# Patient Record
Sex: FEMALE | Race: BLACK OR AFRICAN AMERICAN | Employment: PART TIME | URBAN - METROPOLITAN AREA
[De-identification: names, ages, dates, MRNs, and addresses within clinical notes are randomized per-mention and may not be internally consistent; named-entity substitution may affect disease eponyms.]

---

## 2014-10-13 LAB — PAP SMEAR, EXTERNAL: NORMAL

## 2015-08-27 LAB — HIV AG/AB: NORMAL

## 2021-09-08 ENCOUNTER — OFFICE VISIT (OUTPATIENT)
Dept: FAMILY MEDICINE CLINIC | Age: 31
End: 2021-09-08

## 2021-09-08 VITALS
SYSTOLIC BLOOD PRESSURE: 120 MMHG | DIASTOLIC BLOOD PRESSURE: 80 MMHG | BODY MASS INDEX: 37.49 KG/M2 | HEART RATE: 100 BPM | HEIGHT: 65 IN | WEIGHT: 225 LBS | OXYGEN SATURATION: 99 % | TEMPERATURE: 97.3 F

## 2021-09-08 DIAGNOSIS — J02.0 ACUTE STREPTOCOCCAL PHARYNGITIS: ICD-10-CM

## 2021-09-08 DIAGNOSIS — J02.9 SORE THROAT: Primary | ICD-10-CM

## 2021-09-08 LAB
Lab: NORMAL
PERFORMING INSTRUMENT: NORMAL
QC PASS/FAIL: NORMAL
S PYO AG THROAT QL: POSITIVE
SARS-COV-2, POC: NORMAL

## 2021-09-08 PROCEDURE — 87426 SARSCOV CORONAVIRUS AG IA: CPT | Performed by: NURSE PRACTITIONER

## 2021-09-08 PROCEDURE — 87880 STREP A ASSAY W/OPTIC: CPT | Performed by: NURSE PRACTITIONER

## 2021-09-08 PROCEDURE — 99213 OFFICE O/P EST LOW 20 MIN: CPT | Performed by: NURSE PRACTITIONER

## 2021-09-08 RX ORDER — IBUPROFEN 400 MG/1
600 TABLET ORAL EVERY 6 HOURS PRN
COMMUNITY
End: 2022-04-14 | Stop reason: ALTCHOICE

## 2021-09-08 RX ORDER — AMOXICILLIN 875 MG/1
875 TABLET, COATED ORAL 2 TIMES DAILY
Qty: 20 TABLET | Refills: 0 | Status: SHIPPED | OUTPATIENT
Start: 2021-09-08 | End: 2021-09-18

## 2021-09-08 SDOH — ECONOMIC STABILITY: TRANSPORTATION INSECURITY
IN THE PAST 12 MONTHS, HAS LACK OF TRANSPORTATION KEPT YOU FROM MEETINGS, WORK, OR FROM GETTING THINGS NEEDED FOR DAILY LIVING?: NO

## 2021-09-08 SDOH — ECONOMIC STABILITY: TRANSPORTATION INSECURITY
IN THE PAST 12 MONTHS, HAS THE LACK OF TRANSPORTATION KEPT YOU FROM MEDICAL APPOINTMENTS OR FROM GETTING MEDICATIONS?: NO

## 2021-09-08 SDOH — ECONOMIC STABILITY: FOOD INSECURITY: WITHIN THE PAST 12 MONTHS, THE FOOD YOU BOUGHT JUST DIDN'T LAST AND YOU DIDN'T HAVE MONEY TO GET MORE.: SOMETIMES TRUE

## 2021-09-08 SDOH — ECONOMIC STABILITY: FOOD INSECURITY: WITHIN THE PAST 12 MONTHS, YOU WORRIED THAT YOUR FOOD WOULD RUN OUT BEFORE YOU GOT MONEY TO BUY MORE.: SOMETIMES TRUE

## 2021-09-08 ASSESSMENT — ENCOUNTER SYMPTOMS
WHEEZING: 0
EYE ITCHING: 0
SINUS PRESSURE: 0
VOMITING: 0
CHEST TIGHTNESS: 0
SINUS PAIN: 0
NAUSEA: 0
COUGH: 0
SORE THROAT: 1
ABDOMINAL PAIN: 0
EYE DISCHARGE: 0
DIARRHEA: 0
RHINORRHEA: 0
TROUBLE SWALLOWING: 1
COLOR CHANGE: 0
SHORTNESS OF BREATH: 0
EYE REDNESS: 0
VOICE CHANGE: 0

## 2021-09-08 ASSESSMENT — SOCIAL DETERMINANTS OF HEALTH (SDOH): HOW HARD IS IT FOR YOU TO PAY FOR THE VERY BASICS LIKE FOOD, HOUSING, MEDICAL CARE, AND HEATING?: SOMEWHAT HARD

## 2021-09-08 ASSESSMENT — PATIENT HEALTH QUESTIONNAIRE - PHQ9
SUM OF ALL RESPONSES TO PHQ QUESTIONS 1-9: 0
SUM OF ALL RESPONSES TO PHQ9 QUESTIONS 1 & 2: 0
SUM OF ALL RESPONSES TO PHQ QUESTIONS 1-9: 0
1. LITTLE INTEREST OR PLEASURE IN DOING THINGS: 0
SUM OF ALL RESPONSES TO PHQ QUESTIONS 1-9: 0
2. FEELING DOWN, DEPRESSED OR HOPELESS: 0

## 2021-09-08 NOTE — PATIENT INSTRUCTIONS
Patient Education        Strep Throat: Care Instructions  Your Care Instructions     Strep throat is a bacterial infection that causes sudden, severe sore throat and fever. Strep throat, which is caused by bacteria called streptococcus, is treated with antibiotics. Sometimes a strep test is necessary to tell if the sore throat is caused by strep bacteria. Treatment can help ease symptoms and may prevent future problems. Follow-up care is a key part of your treatment and safety. Be sure to make and go to all appointments, and call your doctor if you are having problems. It's also a good idea to know your test results and keep a list of the medicines you take. How can you care for yourself at home? · Take your antibiotics as directed. Do not stop taking them just because you feel better. You need to take the full course of antibiotics. · Strep throat can spread to others until 24 hours after you begin taking antibiotics. During this time, avoid contact with other people at work, school, or home, especially infants and children. Do not sneeze or cough on others, and wash your hands often. Keep your drinking glass and eating utensils separate from those of others. Wash these items well in hot, soapy water. · Gargle with warm salt water at least once each hour to help reduce swelling and make your throat feel better. Use 1 teaspoon of salt mixed in 8 fluid ounces of warm water. · Take an over-the-counter pain medication, such as acetaminophen (Tylenol), ibuprofen (Advil, Motrin), or naproxen (Aleve). Read and follow all instructions on the label. · Try an over-the-counter anesthetic throat spray or throat lozenges, which may help relieve throat pain. · Drink plenty of fluids. Fluids may help soothe an irritated throat. Hot fluids, such as tea or soup, may help your throat feel better. · Eat soft solids and drink plenty of clear liquids.  Flavored ice pops, ice cream, scrambled eggs, sherbet, and gelatin dessert (such as Jell-O) may also soothe the throat. · Get lots of rest.  · Do not smoke, and avoid secondhand smoke. If you need help quitting, talk to your doctor about stop-smoking programs and medicines. These can increase your chances of quitting for good. · Use a vaporizer or humidifier to add moisture to the air in your bedroom. Follow the directions for cleaning the machine. When should you call for help? Call your doctor now or seek immediate medical care if:    · You have a new or higher fever.     · You have a fever with a stiff neck or severe headache.     · You have new or worse trouble swallowing.     · Your sore throat gets much worse on one side.     · Your pain becomes much worse on one side of your throat. Watch closely for changes in your health, and be sure to contact your doctor if:    · You are not getting better after 2 days (48 hours).     · You do not get better as expected. Where can you learn more? Go to https://Socius.Whereoscope. org and sign in to your Adcast account. Enter K625 in the FriendFit box to learn more about \"Strep Throat: Care Instructions. \"     If you do not have an account, please click on the \"Sign Up Now\" link. Current as of: December 2, 2020               Content Version: 12.9  © 2006-2021 [employer]. Care instructions adapted under license by [facility] ([facility]). If you have questions about a medical condition or this instruction, always ask your healthcare professional. [place] [address] any warranty or liability for your use of this information. Patient Education        Rapid Strep Test: About This Test  What is it? A rapid strep test checks the bacteria in your throat to see if strep is the cause of your sore throat. Why is this test done? It may be done so your doctor can find out right away whether you have strep throat.  There is another test for strep, called a throat culture, but that test takes a few days to get the results. How do you prepare for the test?  You don't need to do anything before you have this test.  How is the test done? · You will be asked to tilt your head back and open your mouth as wide as possible. · Your doctor will press your tongue down with a flat stick (tongue depressor) and then examine your mouth and throat. · A clean cotton swab will be rubbed over the back of your throat, around your tonsils, and over any red areas or sores to collect a sample. How long does the test take? · The test takes less than a minute. · Results are available in 10 to 15 minutes. Follow-up care is a key part of your treatment and safety. Be sure to make and go to all appointments, and call your doctor if you are having problems. It's also a good idea to keep a list of the medicines you take. Ask your doctor when you can expect to have your test results. Where can you learn more? Go to https://StockLayouts.Flyfit. org and sign in to your [employer] account. Enter B356 in the GT Solar box to learn more about \"Rapid Strep Test: About This Test.\"     If you do not have an account, please click on the \"Sign Up Now\" link. Current as of: December 2, 2020               Content Version: 12.9  © 4904-3479 Health[employer], [employer]. Care instructions adapted under license by [facility] ([facility]). If you have questions about a medical condition or this instruction, always ask your healthcare professional. [place] [address] any warranty or liability for your use of this information.

## 2021-09-08 NOTE — PROGRESS NOTES
Subjective:      Patient ID: Annette Conrad is a 28 y.o. female who presents today for:  Chief Complaint   Patient presents with    Pharyngitis     pt states that sore throat started sunday and has gotten progressively worst        HPI  Patient is here with c/o sore throat for the last 4 days. Reports pain with swallowing. She is not sure about fever but having chills. Says she has not other Uri symptoms. She has been taking tylenol and motrin with OTC salt water. Says she has has strep in the past but does not get it often. History reviewed. No pertinent past medical history. History reviewed. No pertinent surgical history. Social History     Socioeconomic History    Marital status:      Spouse name: Not on file    Number of children: Not on file    Years of education: Not on file    Highest education level: Not on file   Occupational History    Not on file   Tobacco Use    Smoking status: Current Every Day Smoker     Packs/day: 1.00     Years: 10.00     Pack years: 10.00     Types: Cigars    Smokeless tobacco: Never Used   Substance and Sexual Activity    Alcohol use: Yes     Alcohol/week: 2.0 standard drinks     Types: 2 Standard drinks or equivalent per week    Drug use: Never    Sexual activity: Not on file   Other Topics Concern    Not on file   Social History Narrative    Not on file     Social Determinants of Health     Financial Resource Strain: Medium Risk    Difficulty of Paying Living Expenses: Somewhat hard   Food Insecurity: Food Insecurity Present    Worried About Running Out of Food in the Last Year: Sometimes true    Aidan of Food in the Last Year: Sometimes true   Transportation Needs: No Transportation Needs    Lack of Transportation (Medical): No    Lack of Transportation (Non-Medical):  No   Physical Activity:     Days of Exercise per Week:     Minutes of Exercise per Session:    Stress:     Feeling of Stress :    Social Connections:     Frequency of Communication with Friends and Family:     Frequency of Social Gatherings with Friends and Family:     Attends [employer] Services:     Active Member of Clubs or Organizations:     Attends Club or Organization Meetings:     Marital Status:    Intimate Partner Violence:     Fear of Current or Ex-Partner:     Emotionally Abused:     Physically Abused:     Sexually Abused:      History reviewed. No pertinent family history. No Known Allergies  Current Outpatient Medications   Medication Sig Dispense Refill    ibuprofen (ADVIL;MOTRIN) 400 MG tablet Take 600 mg by mouth every 6 hours as needed for Pain      amoxicillin (AMOXIL) 875 MG tablet Take 1 tablet by mouth 2 times daily for 10 days 20 tablet 0     No current facility-administered medications for this visit. Review of Systems   Constitutional: Positive for appetite change, chills and fatigue. Negative for activity change, diaphoresis, fever and unexpected weight change. HENT: Positive for sore throat and trouble swallowing. Negative for congestion, ear discharge, ear pain, postnasal drip, rhinorrhea, sinus pressure, sinus pain, sneezing, tinnitus and voice change. Eyes: Negative for discharge, redness and itching. Respiratory: Negative for cough, chest tightness, shortness of breath and wheezing. Cardiovascular: Negative for chest pain. Gastrointestinal: Negative for abdominal pain, diarrhea, nausea and vomiting. Musculoskeletal: Positive for myalgias. Negative for arthralgias. Skin: Negative for color change and rash. Neurological: Negative for dizziness, light-headedness and headaches. Hematological: Negative for adenopathy. Objective:   /80 (Site: Left Upper Arm, Position: Sitting, Cuff Size: Medium Adult)   Pulse 100   Temp 97.3 °F (36.3 °C) (Temporal)   Ht 5' 5\" (1.651 m)   Wt 225 lb (102.1 kg)   LMP 08/13/2021 (Exact Date)   SpO2 99%   BMI 37.44 kg/m²     Physical Exam  Vitals reviewed. Constitutional:       General: She is awake. She is not in acute distress. Appearance: Normal appearance. She is well-developed, well-groomed and overweight. She is not ill-appearing, toxic-appearing or diaphoretic. HENT:      Head: Normocephalic and atraumatic. Right Ear: Hearing, tympanic membrane, ear canal and external ear normal. There is no impacted cerumen. Left Ear: Hearing, tympanic membrane, ear canal and external ear normal. There is no impacted cerumen. Nose: Nose normal. No congestion or rhinorrhea. Right Turbinates: Not enlarged or swollen. Left Turbinates: Not enlarged or swollen. Right Sinus: No maxillary sinus tenderness or frontal sinus tenderness. Left Sinus: No maxillary sinus tenderness or frontal sinus tenderness. Mouth/Throat:      Lips: Pink. Mouth: Mucous membranes are moist.      Pharynx: Uvula midline. Pharyngeal swelling and posterior oropharyngeal erythema present. No oropharyngeal exudate or uvula swelling. Tonsils: No tonsillar exudate or tonsillar abscesses. 1+ on the right. 1+ on the left. Eyes:      General: Lids are normal.      Extraocular Movements: Extraocular movements intact. Conjunctiva/sclera: Conjunctivae normal.   Neck:      Trachea: Trachea normal.   Cardiovascular:      Rate and Rhythm: Normal rate and regular rhythm. Pulses: Normal pulses. Heart sounds: Normal heart sounds, S1 normal and S2 normal.   Pulmonary:      Effort: Pulmonary effort is normal.      Breath sounds: Normal breath sounds and air entry. Abdominal:      General: Bowel sounds are normal.      Palpations: Abdomen is soft. Musculoskeletal:         General: Normal range of motion. Cervical back: Full passive range of motion without pain and neck supple. Lymphadenopathy:      Cervical: Cervical adenopathy present. Right cervical: Superficial cervical adenopathy present.       Left cervical: Superficial cervical adenopathy present. Skin:     General: Skin is warm and dry. Capillary Refill: Capillary refill takes less than 2 seconds. Neurological:      General: No focal deficit present. Mental Status: She is alert and oriented to person, place, and time. Mental status is at baseline. Psychiatric:         Attention and Perception: Attention and perception normal.         Mood and Affect: Mood and affect normal.         Speech: Speech normal.         Behavior: Behavior normal. Behavior is cooperative. Thought Content: Thought content normal.         Cognition and Memory: Cognition and memory normal.         Judgment: Judgment normal.         Assessment:       Diagnosis Orders   1. Sore throat  POCT rapid strep A    POCT COVID-19, Antigen   2. Acute streptococcal pharyngitis  amoxicillin (AMOXIL) 875 MG tablet     Results for POC orders placed in visit on 09/08/21   POCT rapid strep A   Result Value Ref Range    Strep A Ag Positive (A) None Detected      Plan:     Assessment & Plan   Sky Parker was seen today for pharyngitis. Diagnoses and all orders for this visit:    Discussed with patient Covid testing is neg, strep testing is positive. Advised will treat with oral ANTB. Advised on use and administration. Encouraged probiotic/yogurt while on the ANTB. Discussed to cont with Motrin, Tylenol, and warm salt water gargles. Advised should see improvement in 2-3 days. Advised to call if sx worsen or do not improve. Advised to change toothbrush and not allowing other to ear or drink after her for at least 24 hours. Advised to call with any questions or concerns. Sore throat  -     POCT rapid strep A  -     POCT COVID-19, Antigen    Acute streptococcal pharyngitis  -     amoxicillin (AMOXIL) 875 MG tablet;  Take 1 tablet by mouth 2 times daily for 10 days      Orders Placed This Encounter   Procedures    POCT rapid strep A    POCT COVID-19, Antigen     Order Specific Question:   Is this test for diagnosis or screening? Answer:   Diagnosis of ill patient     Order Specific Question:   Symptomatic for COVID-19 as defined by CDC? Answer:   Yes     Order Specific Question:   Date of Symptom Onset     Answer:   9/5/2021     Order Specific Question:   Hospitalized for COVID-19? Answer:   No     Order Specific Question:   Admitted to ICU for COVID-19? Answer:   No     Order Specific Question:   Employed in healthcare setting? Answer:   Unknown     Order Specific Question:   Resident in a congregate (group) care setting? Answer:   Unknown     Order Specific Question:   Pregnant? Answer:   No     Order Specific Question:   Previously tested for COVID-19? Answer:   No     Orders Placed This Encounter   Medications    amoxicillin (AMOXIL) 875 MG tablet     Sig: Take 1 tablet by mouth 2 times daily for 10 days     Dispense:  20 tablet     Refill:  0     There are no discontinued medications. Return for if symptoms do not improve in 3-5 days. Reviewed with the patient/family: current clinical status & medications. Side effects of the medication prescribed today, as well as treatment plan/rationale and result expectations have been discussed with the patient/family who expresses understanding. Patient will be discharged home in stable condition. Follow up with PCP to evaluate treatment results or return if symptoms worsen or fail to improve. Discussed signs and symptoms which require immediate follow-up in ED/call to 911. Understanding verbalized. I have reviewed the patient's medical history in detail and updated the computerized patient record.     Angelina Tello, MEENA - CNP

## 2022-03-25 ENCOUNTER — OFFICE VISIT (OUTPATIENT)
Dept: INTERNAL MEDICINE | Age: 32
End: 2022-03-25
Payer: COMMERCIAL

## 2022-03-25 VITALS
DIASTOLIC BLOOD PRESSURE: 74 MMHG | BODY MASS INDEX: 41.8 KG/M2 | TEMPERATURE: 96.5 F | OXYGEN SATURATION: 98 % | HEART RATE: 94 BPM | WEIGHT: 251.2 LBS | SYSTOLIC BLOOD PRESSURE: 120 MMHG

## 2022-03-25 DIAGNOSIS — R50.9 FEVER, UNSPECIFIED FEVER CAUSE: ICD-10-CM

## 2022-03-25 DIAGNOSIS — B34.9 VIRAL ILLNESS: Primary | ICD-10-CM

## 2022-03-25 LAB
INFLUENZA A ANTIBODY: NEGATIVE
INFLUENZA B ANTIBODY: NEGATIVE

## 2022-03-25 PROCEDURE — 99213 OFFICE O/P EST LOW 20 MIN: CPT | Performed by: NURSE PRACTITIONER

## 2022-03-25 PROCEDURE — 87804 INFLUENZA ASSAY W/OPTIC: CPT | Performed by: NURSE PRACTITIONER

## 2022-03-25 RX ORDER — ALBUTEROL SULFATE 90 UG/1
2 AEROSOL, METERED RESPIRATORY (INHALATION) EVERY 4 HOURS PRN
COMMUNITY
Start: 2021-10-21

## 2022-03-25 RX ORDER — ALBUTEROL SULFATE 2.5 MG/3ML
2.5 SOLUTION RESPIRATORY (INHALATION) EVERY 4 HOURS PRN
COMMUNITY
Start: 2021-10-21

## 2022-03-25 ASSESSMENT — ENCOUNTER SYMPTOMS
SORE THROAT: 1
VOMITING: 0
SINUS PAIN: 0
RHINORRHEA: 0
SINUS PRESSURE: 0
WHEEZING: 0
SHORTNESS OF BREATH: 0
COUGH: 1
ABDOMINAL PAIN: 0
NAUSEA: 0
DIARRHEA: 0

## 2022-03-25 NOTE — PROGRESS NOTES
Subjective:      Patient ID: Rudy Brewer is a 33 y.o. female who presents today for:  Chief Complaint   Patient presents with    Congestion     cough, started tuesday       URI   This is a new problem. Episode onset: 3 days ago. The problem has been unchanged. Maximum temperature: subjective. Associated symptoms include congestion, coughing, ear pain, headaches and a sore throat. Pertinent negatives include no abdominal pain, chest pain, diarrhea, nausea, rash, rhinorrhea, sinus pain, vomiting or wheezing. Associated symptoms comments: Denies loss of sense of taste or smell, no known exposure to covid. Treatments tried: dayquil. The treatment provided mild relief. History reviewed. No pertinent past medical history. History reviewed. No pertinent surgical history. History reviewed. No pertinent family history. Allergies   Allergen Reactions    Cat Hair Extract      Other reaction(s): Other: See Comments  Stuffy nose itchy throat    Other      SEASONAL  Stuffy nose         Review of Systems   Constitutional: Positive for fatigue and fever (subjective). Negative for chills. HENT: Positive for congestion, ear pain and sore throat. Negative for postnasal drip, rhinorrhea, sinus pressure and sinus pain. Respiratory: Positive for cough. Negative for shortness of breath and wheezing. Cardiovascular: Negative for chest pain. Gastrointestinal: Negative for abdominal pain, diarrhea, nausea and vomiting. Musculoskeletal: Positive for myalgias. Negative for arthralgias. Skin: Negative for rash. Neurological: Positive for headaches. Objective:   /74   Pulse 94   Temp 96.5 °F (35.8 °C) (Infrared)   Wt 251 lb 3.2 oz (113.9 kg)   SpO2 98%   BMI 41.80 kg/m²     Physical Exam  Vitals reviewed. Constitutional:       General: She is not in acute distress. Appearance: She is well-developed. She is not ill-appearing. HENT:      Head: Normocephalic.       Right Ear: Tympanic membrane, ear canal and external ear normal.      Left Ear: Tympanic membrane, ear canal and external ear normal.      Nose: Nose normal.      Mouth/Throat:      Lips: Pink. Mouth: Mucous membranes are moist.      Pharynx: Oropharynx is clear. No oropharyngeal exudate or posterior oropharyngeal erythema. Cardiovascular:      Rate and Rhythm: Normal rate and regular rhythm. Heart sounds: Normal heart sounds. Pulmonary:      Effort: Pulmonary effort is normal. No respiratory distress. Breath sounds: Normal breath sounds. No decreased breath sounds or wheezing. Musculoskeletal:         General: Normal range of motion. Lymphadenopathy:      Cervical: No cervical adenopathy. Skin:     General: Skin is warm and dry. Neurological:      Mental Status: She is alert and oriented to person, place, and time. Assessment:       Diagnosis Orders   1. Viral illness     2. Fever, unspecified fever cause  POCT Influenza A/B         Plan:      Orders Placed This Encounter   Procedures    POCT Influenza A/B     Flu negative. Declined covid testing. Reviewed usual course of a viral illness, preventing the spread to others, and supportive measures for symptom management. OTC symptom control. Reviewed symptoms requiring ER. Patient verbalizes understanding. I have reviewed and updated the electronic medical record. Return if symptoms worsen or fail to improve, for follow up with PCP.     Ja Ahr, APRN - NP

## 2022-03-29 ENCOUNTER — TELEPHONE (OUTPATIENT)
Dept: INTERNAL MEDICINE | Age: 32
End: 2022-03-29

## 2022-03-29 NOTE — TELEPHONE ENCOUNTER
Call center message. Pt is requesting a return to work note. She was seen last week, I wasn't sure how many days you excused her out of work for. Thank you!

## 2022-03-29 NOTE — TELEPHONE ENCOUNTER
----- Message from Rola Cardenas sent at 3/29/2022  9:03 AM EDT -----  Subject: Message to Provider    QUESTIONS  Information for Provider? Patient was in walk in clinic last Friday on   3-25, and needs a return to work note faxed in to? [phone].   ---------------------------------------------------------------------------  --------------  [address]  What is the best way for the office to contact you? OK to leave message on   voicemail  Preferred Call Back Phone Number? [phone]  ---------------------------------------------------------------------------  --------------  SCRIPT ANSWERS  Relationship to Patient?  Self

## 2022-04-14 ENCOUNTER — OFFICE VISIT (OUTPATIENT)
Dept: INTERNAL MEDICINE | Age: 32
End: 2022-04-14
Payer: COMMERCIAL

## 2022-04-14 VITALS
DIASTOLIC BLOOD PRESSURE: 86 MMHG | HEIGHT: 66 IN | SYSTOLIC BLOOD PRESSURE: 118 MMHG | BODY MASS INDEX: 40.34 KG/M2 | OXYGEN SATURATION: 97 % | HEART RATE: 104 BPM | WEIGHT: 251 LBS

## 2022-04-14 DIAGNOSIS — Z13.1 SCREENING FOR DIABETES MELLITUS: ICD-10-CM

## 2022-04-14 DIAGNOSIS — Z13.220 SCREENING, LIPID: ICD-10-CM

## 2022-04-14 DIAGNOSIS — Z83.49 FAMILY HISTORY OF THYROID DISEASE: ICD-10-CM

## 2022-04-14 DIAGNOSIS — E66.01 MORBID OBESITY WITH BMI OF 40.0-44.9, ADULT (HCC): ICD-10-CM

## 2022-04-14 DIAGNOSIS — Z13.29 THYROID DISORDER SCREEN: ICD-10-CM

## 2022-04-14 DIAGNOSIS — J45.20 MILD INTERMITTENT ASTHMA WITHOUT COMPLICATION: ICD-10-CM

## 2022-04-14 DIAGNOSIS — E66.01 MORBID OBESITY WITH BMI OF 40.0-44.9, ADULT (HCC): Primary | ICD-10-CM

## 2022-04-14 LAB
ALBUMIN SERPL-MCNC: 4.5 G/DL (ref 3.5–4.6)
ALP BLD-CCNC: 63 U/L (ref 40–130)
ALT SERPL-CCNC: 19 U/L (ref 0–33)
ANION GAP SERPL CALCULATED.3IONS-SCNC: 12 MEQ/L (ref 9–15)
AST SERPL-CCNC: 23 U/L (ref 0–35)
BASOPHILS ABSOLUTE: 0 K/UL (ref 0–0.2)
BASOPHILS RELATIVE PERCENT: 0.5 %
BILIRUB SERPL-MCNC: <0.2 MG/DL (ref 0.2–0.7)
BUN BLDV-MCNC: 13 MG/DL (ref 6–20)
CALCIUM SERPL-MCNC: 9.2 MG/DL (ref 8.5–9.9)
CHLORIDE BLD-SCNC: 104 MEQ/L (ref 95–107)
CHOLESTEROL, FASTING: 203 MG/DL (ref 0–199)
CO2: 20 MEQ/L (ref 20–31)
CREAT SERPL-MCNC: 0.57 MG/DL (ref 0.5–0.9)
EOSINOPHILS ABSOLUTE: 0.1 K/UL (ref 0–0.7)
EOSINOPHILS RELATIVE PERCENT: 1.6 %
GFR AFRICAN AMERICAN: >60
GFR NON-AFRICAN AMERICAN: >60
GLOBULIN: 2.8 G/DL (ref 2.3–3.5)
GLUCOSE BLD-MCNC: 149 MG/DL (ref 70–99)
HBA1C MFR BLD: 6.5 % (ref 4.8–5.9)
HCT VFR BLD CALC: 41.4 % (ref 37–47)
HDLC SERPL-MCNC: 54 MG/DL (ref 40–59)
HEMOGLOBIN: 14 G/DL (ref 12–16)
LDL CHOLESTEROL CALCULATED: 120 MG/DL (ref 0–129)
LYMPHOCYTES ABSOLUTE: 1.6 K/UL (ref 1–4.8)
LYMPHOCYTES RELATIVE PERCENT: 24.2 %
MCH RBC QN AUTO: 32.4 PG (ref 27–31.3)
MCHC RBC AUTO-ENTMCNC: 33.8 % (ref 33–37)
MCV RBC AUTO: 95.8 FL (ref 82–100)
MONOCYTES ABSOLUTE: 0.4 K/UL (ref 0.2–0.8)
MONOCYTES RELATIVE PERCENT: 6.8 %
NEUTROPHILS ABSOLUTE: 4.4 K/UL (ref 1.4–6.5)
NEUTROPHILS RELATIVE PERCENT: 66.9 %
PDW BLD-RTO: 13.5 % (ref 11.5–14.5)
PLATELET # BLD: 271 K/UL (ref 130–400)
PLATELET SLIDE REVIEW: NORMAL
POTASSIUM SERPL-SCNC: 5.1 MEQ/L (ref 3.4–4.9)
RBC # BLD: 4.32 M/UL (ref 4.2–5.4)
RBC # BLD: NORMAL 10*6/UL
SODIUM BLD-SCNC: 136 MEQ/L (ref 135–144)
TOTAL PROTEIN: 7.3 G/DL (ref 6.3–8)
TRIGLYCERIDE, FASTING: 143 MG/DL (ref 0–150)
TSH REFLEX: 0.6 UIU/ML (ref 0.44–3.86)
WBC # BLD: 6.5 K/UL (ref 4.8–10.8)

## 2022-04-14 PROCEDURE — 99213 OFFICE O/P EST LOW 20 MIN: CPT | Performed by: NURSE PRACTITIONER

## 2022-04-14 ASSESSMENT — ENCOUNTER SYMPTOMS
DIARRHEA: 0
RESPIRATORY NEGATIVE: 1
SHORTNESS OF BREATH: 0
CONSTIPATION: 0
WHEEZING: 0
NAUSEA: 0
COUGH: 0

## 2022-04-14 NOTE — PROGRESS NOTES
[address] [facility] [address] [facility] PRIMARY CARE  [address] [facility] [address]  Dept: [phone]  Dept Fax: [phone]: [phone]     MARY Dodge (: 1990) is a 33 y.o. female, Established patient, here for evaluation of the following chief complaint(s):  Establish Care (No prev pcp. Have not been able to lose wt. says that Thyroid issues run in the family )      PCP:  MEENA Calderon CNP      Pt here today with her kids to establish care. Frustrated with her weight. Prior to having children she was slim. When had her son, gained a lot of weight but was not gestational diabetic. Started to lose and then got pregnant with her daughter. Since then, just can't lose anything. Has tried \"every type of diet there is\", but only has ever been able to lose 5 lbs at most. Has exercised like crazy, but nothing happens. Doesn't eat sweets. Does like fruit. Doesn't like red meat. So, doesn't feel eats badly. Frustrated. Knows has both hyper and hypothyroidism on both sides of family. Has never had lab work done for checkup and would like to. Last pap was  prior to having her daughter. Review of Systems   Constitutional: Negative. Negative for fatigue and unexpected weight change (frustrated with lack of weight loss). Eyes: Negative for visual disturbance. Respiratory: Negative. Negative for cough, shortness of breath and wheezing. Cardiovascular: Negative. Negative for chest pain, palpitations and leg swelling. Gastrointestinal: Negative for constipation, diarrhea and nausea. Endocrine: Negative for polydipsia, polyphagia and polyuria. Psychiatric/Behavioral: Negative. Negative for dysphoric mood. The patient is not nervous/anxious.         Past Medical History:   Diagnosis Date    Asthma      Past Surgical History:   Procedure Laterality Date     SECTION      x's 2     Social History     Socioeconomic History    Marital status:      Spouse name: Not on file    Number of children: Not on file    Years of education: Not on file    Highest education level: Not on file   Occupational History    Not on file   Tobacco Use    Smoking status: Current Every Day Smoker     Packs/day: 1.00     Years: 10.00     Pack years: 10.00     Types: Cigars    Smokeless tobacco: Never Used   Substance and Sexual Activity    Alcohol use: Yes     Alcohol/week: 2.0 standard drinks     Types: 2 Standard drinks or equivalent per week    Drug use: Never    Sexual activity: Not on file   Other Topics Concern    Not on file   Social History Narrative    Not on file     Social Determinants of Health     Financial Resource Strain: Medium Risk    Difficulty of Paying Living Expenses: Somewhat hard   Food Insecurity: Food Insecurity Present    Worried About Running Out of Food in the Last Year: Sometimes true    Aidan of Food in the Last Year: Sometimes true   Transportation Needs: No Transportation Needs    Lack of Transportation (Medical): No    Lack of Transportation (Non-Medical):  No   Physical Activity:     Days of Exercise per Week: Not on file    Minutes of Exercise per Session: Not on file   Stress:     Feeling of Stress : Not on file   Social Connections:     Frequency of Communication with Friends and Family: Not on file    Frequency of Social Gatherings with Friends and Family: Not on file    Attends Synagogue Services: Not on file    Active Member of Clubs or Organizations: Not on file    Attends Club or Organization Meetings: Not on file    Marital Status: Not on file   Intimate Partner Violence:     Fear of Current or Ex-Partner: Not on file    Emotionally Abused: Not on file    Physically Abused: Not on file    Sexually Abused: Not on file   Housing Stability:     Unable to Pay for Housing in the Last Year: Not on file    Number of [place] in the Last Year: Not on file    Unstable Housing in the Last Year: Not on file     Family History   Problem Relation Age of Onset    Hypothyroidism Mother     Diabetes Father          age 36 MVA    Diabetes type 2  Father     Diabetes type 2  Paternal Grandmother         dec age 68    Diabetes type 2  Paternal Great Grandmother       Allergies   Allergen Reactions    Cat Hair Extract      Other reaction(s): Other: See Comments  Stuffy nose itchy throat    Other      SEASONAL  Stuffy nose     Prior to Admission medications    Medication Sig Start Date End Date Taking? Authorizing Provider   albuterol sulfate  (90 Base) MCG/ACT inhaler Inhale 2 puffs into the lungs every 4 hours as needed 10/21/21  Yes Historical Provider, MD   albuterol (PROVENTIL) (2.5 MG/3ML) 0.083% nebulizer solution Inhale 2.5 mg into the lungs every 4 hours as needed 10/21/21  Yes Historical Provider, MD                I have reviewed the patient's medical history in detail and updated the computerized patient record. OBJECTIVE    Vitals:    22 0833   BP: 118/86   Site: Left Upper Arm   Position: Sitting   Cuff Size: Large Adult   Pulse: 104   SpO2: 97%   Weight: 251 lb (113.9 kg)   Height: 5' 6\" (1.676 m)       Physical Exam  Vitals and nursing note reviewed. Constitutional:       General: She is not in acute distress. Appearance: Normal appearance. She is obese. HENT:      Head: Normocephalic and atraumatic. Eyes:      Conjunctiva/sclera: Conjunctivae normal.   Cardiovascular:      Rate and Rhythm: Normal rate and regular rhythm. Heart sounds: Normal heart sounds. Pulmonary:      Effort: Pulmonary effort is normal. No respiratory distress. Breath sounds: Normal breath sounds. Musculoskeletal:         General: Normal range of motion. Cervical back: Normal range of motion and neck supple. No rigidity or tenderness. Lymphadenopathy:      Cervical: No cervical adenopathy.    Skin:     General: Skin is warm and dry. Neurological:      Mental Status: She is alert and oriented to person, place, and time. Psychiatric:         Mood and Affect: Mood normal.         Thought Content: Thought content normal.           ASSESSMENT/ PLAN    1. Morbid obesity with BMI of 40.0-44.9, adult ([address]  -chronic, can't control it  -recommend checking labs  -discussed and suggested [employer], encouraged to get the book and read about it  -could consider weight loss meds if labs all normal, has never been on anything  - Comprehensive Metabolic Panel; Future  - CBC with Auto Differential; Future    2. Mild intermittent asthma without complication  -chronic, stable  -was diagnosed this last yr, but suspects has had a long time  -prn albuterol inhaler  - CBC with Auto Differential; Future    3. Thyroid disorder screen  -inability to lose weight, family hx of hypo and hyperthyroidism, including her mom  - TSH with Reflex; Future    4. Screening for diabetes mellitus  - Comprehensive Metabolic Panel; Future  - Hemoglobin A1C; Future    5. Screening, lipid  - Lipid, Fasting; Future    6. Family history of thyroid disease  - TSH with Reflex; Future      Return in about 1 month (around 5/14/2022) for  wellness visit with pap.      Electronically signed by:  MEENA Munroe - CNP   4/14/22

## 2022-04-14 NOTE — PATIENT INSTRUCTIONS
Encouraged to improve diet in order to reduce weight. Recommend reducing portion sizes and sugar intake. [facility] is a great lifestyle/diet change and was discussed today. Increase activity to goal of 30 min a day at least 4 days a week. Weight Management     Some risk factors for overweight   Cardiovascular disease (family history of coronary heart disease)   Diabetes mellitus   Hyperlipidemia   Hypertension   Tobacco usage    Age   Physical inactivity     Tips to manage your condition  1. Your BMI should be BELOW 30  2. You may want to see a registered dietitian to receive help in healthy meal planning  3. Weight loss reduces insulin resistance and is recommended for all overweight individuals with or at risk for diabetes. Either low-carbohydrate or low-fat calorie-restricted diets may work for you. 4. Use a food log to keep track of what you eat. A food log will help you identify habits that keep you from reaching your goal to lose weight. Writing down what you eat helps you take a critical look at your food habits and make healthy changes. Here are some tips. Write down what you eat on three weekdays and one weekend day, using the following guidelines:  1. Record everything you eat and drink immediately. 2. Note what youre doing while youre eating¯driving, watching TV, etc.   3. Describe how you felt while you ate: angry, sad, happy, nervous, starving, bored? 4. Be honest. Its a journal, not a newsletter, and no one has to see it but you. 5. At the end of each day, examine how your emotions affected your eating.      FOODLOG  Day Time Mood Food / Drink Quantity Calories Other   Mon                                 Tues                                       Wed                                       Thurs                                       Fri                                       Sat                                       Sun                                           Tips for healthy living  1. Start your day with breakfast  2. Exercise and slowly progress to (brisk walking, bike riding, etc) 30 minutes 3 to 5 days a week. 3. Snack in moderation (limit eating sugary or salty foods to no more than 3 times a week). 4. Eat more grains and vegetables (have no more than 3 servings of fruit daily). 5. Avoid tobacco use. 6. Drink alcohol in moderation (no more than 1 serving daily for woman and no more than 2 servings daily for men)  7. Obtain annual flu shot  8. Refer to patient education handouts given to you today. Weight Management Community Resources   Organization Address Phone Website   [facility] ([facility]) [address] [facility], [place], [address] [facility] [phone] n/a   Weight [employer] Multiple meeting locations throughout [address] [place] [address] [email or web address]     Tops n/a n/a www. [employer]. [address] [place] [address] Carlene [address] [phone] [email or web address]    [facility] [address] [place] [address] [address] [phone] [facility]    [address] [place] [address] [address] [phone]

## 2022-04-18 ENCOUNTER — TELEPHONE (OUTPATIENT)
Dept: INTERNAL MEDICINE | Age: 32
End: 2022-04-18

## 2022-04-18 DIAGNOSIS — E11.9 CONTROLLED TYPE 2 DIABETES MELLITUS WITHOUT COMPLICATION, WITHOUT LONG-TERM CURRENT USE OF INSULIN (HCC): ICD-10-CM

## 2022-04-18 DIAGNOSIS — E66.01 MORBID OBESITY (HCC): ICD-10-CM

## 2022-04-18 RX ORDER — METFORMIN HYDROCHLORIDE 500 MG/1
1000 TABLET, EXTENDED RELEASE ORAL
Qty: 90 TABLET | Refills: 1 | Status: SHIPPED | OUTPATIENT
Start: 2022-04-18 | End: 2022-07-18

## 2022-04-18 NOTE — TELEPHONE ENCOUNTER
Let know her labs show she is diabetic. Thyroid and rest of tests were fine. She needs to start medication called metformin (sent to [facility]). Could help with her weight issues. Please schedule a 3 month f/u so can recheck levels and make sure improving.

## 2022-07-18 DIAGNOSIS — E11.9 CONTROLLED TYPE 2 DIABETES MELLITUS WITHOUT COMPLICATION, WITHOUT LONG-TERM CURRENT USE OF INSULIN (HCC): ICD-10-CM

## 2022-07-18 RX ORDER — METFORMIN HYDROCHLORIDE 500 MG/1
TABLET, EXTENDED RELEASE ORAL
Qty: 90 TABLET | Refills: 1 | Status: SHIPPED | OUTPATIENT
Start: 2022-07-18 | End: 2022-10-24

## 2022-07-18 NOTE — TELEPHONE ENCOUNTER
Comments: Pt aware she needs appt and will call back    Last Office Visit (last PCP visit):   4/14/2022    Next Visit Date:  No future appointments. **If hasn't been seen in over a year OR hasn't followed up according to last diabetes/ADHD visit, make appointment for patient before sending refill to provider.     Rx requested:  Requested Prescriptions     Pending Prescriptions Disp Refills    metFORMIN (GLUCOPHAGE-XR) 500 MG extended release tablet [Pharmacy Med Name: METFORMIN HCL  MG TABLET] 90 tablet 1     Sig: take 2 tablets by mouth once daily with breakfast

## 2022-07-25 ENCOUNTER — TELEPHONE (OUTPATIENT)
Dept: INTERNAL MEDICINE | Age: 32
End: 2022-07-25

## 2022-07-25 ENCOUNTER — OFFICE VISIT (OUTPATIENT)
Dept: INTERNAL MEDICINE | Age: 32
End: 2022-07-25
Payer: COMMERCIAL

## 2022-07-25 ENCOUNTER — HOSPITAL ENCOUNTER (OUTPATIENT)
Dept: CT IMAGING | Age: 32
Discharge: HOME | End: 2022-07-27
Payer: COMMERCIAL

## 2022-07-25 VITALS
OXYGEN SATURATION: 88 % | RESPIRATION RATE: 16 BRPM | TEMPERATURE: 97.7 F | DIASTOLIC BLOOD PRESSURE: 74 MMHG | SYSTOLIC BLOOD PRESSURE: 108 MMHG | BODY MASS INDEX: 40.69 KG/M2 | WEIGHT: 253.2 LBS | HEART RATE: 88 BPM | HEIGHT: 66 IN

## 2022-07-25 DIAGNOSIS — F33.1 MODERATE EPISODE OF RECURRENT MAJOR DEPRESSIVE DISORDER (HCC): ICD-10-CM

## 2022-07-25 DIAGNOSIS — E11.9 CONTROLLED TYPE 2 DIABETES MELLITUS WITHOUT COMPLICATION, WITHOUT LONG-TERM CURRENT USE OF INSULIN (HCC): ICD-10-CM

## 2022-07-25 DIAGNOSIS — R31.29 MICROSCOPIC HEMATURIA: ICD-10-CM

## 2022-07-25 DIAGNOSIS — E66.01 MORBID OBESITY (HCC): ICD-10-CM

## 2022-07-25 DIAGNOSIS — R10.9 LEFT FLANK PAIN: ICD-10-CM

## 2022-07-25 DIAGNOSIS — G47.30 SLEEP APNEA, UNSPECIFIED TYPE: ICD-10-CM

## 2022-07-25 DIAGNOSIS — E11.9 CONTROLLED TYPE 2 DIABETES MELLITUS WITHOUT COMPLICATION, WITHOUT LONG-TERM CURRENT USE OF INSULIN (HCC): Primary | ICD-10-CM

## 2022-07-25 LAB
BILIRUBIN, POC: ABNORMAL
BLOOD URINE, POC: ABNORMAL
CLARITY, POC: CLEAR
COLOR, POC: YELLOW
CREATININE URINE: 208.3 MG/DL
GLUCOSE URINE, POC: ABNORMAL
HBA1C MFR BLD: 6.2 %
KETONES, POC: ABNORMAL
LEUKOCYTE EST, POC: ABNORMAL
MICROALBUMIN UR-MCNC: 1.4 MG/DL
MICROALBUMIN/CREAT UR-RTO: 6.7 MG/G (ref 0–30)
NITRITE, POC: ABNORMAL
PH, POC: 6
PROTEIN, POC: ABNORMAL
SPECIFIC GRAVITY, POC: 1.03
UROBILINOGEN, POC: ABNORMAL

## 2022-07-25 PROCEDURE — 83036 HEMOGLOBIN GLYCOSYLATED A1C: CPT | Performed by: NURSE PRACTITIONER

## 2022-07-25 PROCEDURE — 74176 CT ABD & PELVIS W/O CONTRAST: CPT

## 2022-07-25 PROCEDURE — 3044F HG A1C LEVEL LT 7.0%: CPT | Performed by: NURSE PRACTITIONER

## 2022-07-25 PROCEDURE — 81003 URINALYSIS AUTO W/O SCOPE: CPT | Performed by: NURSE PRACTITIONER

## 2022-07-25 PROCEDURE — 99215 OFFICE O/P EST HI 40 MIN: CPT | Performed by: NURSE PRACTITIONER

## 2022-07-25 RX ORDER — TAMSULOSIN HYDROCHLORIDE 0.4 MG/1
0.4 CAPSULE ORAL DAILY
Qty: 7 CAPSULE | Refills: 0 | Status: SHIPPED | OUTPATIENT
Start: 2022-07-25 | End: 2022-07-27

## 2022-07-25 RX ORDER — HYOSCYAMINE SULFATE 0.12 MG/1
1 TABLET SUBLINGUAL 3 TIMES DAILY PRN
Qty: 21 EACH | Refills: 0 | Status: SHIPPED | OUTPATIENT
Start: 2022-07-25 | End: 2022-07-27

## 2022-07-25 RX ORDER — SEMAGLUTIDE 1.34 MG/ML
1 INJECTION, SOLUTION SUBCUTANEOUS WEEKLY
Qty: 2 PEN | Refills: 5 | Status: SHIPPED | OUTPATIENT
Start: 2022-07-25 | End: 2022-10-05 | Stop reason: SINTOL

## 2022-07-25 RX ORDER — SEMAGLUTIDE 1.34 MG/ML
INJECTION, SOLUTION SUBCUTANEOUS
Qty: 1 PEN | Refills: 0 | Status: SHIPPED | OUTPATIENT
Start: 2022-07-25 | End: 2022-10-05

## 2022-07-25 ASSESSMENT — ENCOUNTER SYMPTOMS
WHEEZING: 0
BLOOD IN STOOL: 0
NAUSEA: 0
CONSTIPATION: 0
DIARRHEA: 0
COUGH: 0
ABDOMINAL PAIN: 1
SHORTNESS OF BREATH: 0
VOMITING: 0

## 2022-07-25 ASSESSMENT — PATIENT HEALTH QUESTIONNAIRE - PHQ9
SUM OF ALL RESPONSES TO PHQ QUESTIONS 1-9: 15
6. FEELING BAD ABOUT YOURSELF - OR THAT YOU ARE A FAILURE OR HAVE LET YOURSELF OR YOUR FAMILY DOWN: 0
SUM OF ALL RESPONSES TO PHQ QUESTIONS 1-9: 15
SUM OF ALL RESPONSES TO PHQ9 QUESTIONS 1 & 2: 4
3. TROUBLE FALLING OR STAYING ASLEEP: 2
SUM OF ALL RESPONSES TO PHQ QUESTIONS 1-9: 15
5. POOR APPETITE OR OVEREATING: 3
9. THOUGHTS THAT YOU WOULD BE BETTER OFF DEAD, OR OF HURTING YOURSELF: 0
2. FEELING DOWN, DEPRESSED OR HOPELESS: 1
4. FEELING TIRED OR HAVING LITTLE ENERGY: 3
1. LITTLE INTEREST OR PLEASURE IN DOING THINGS: 3
SUM OF ALL RESPONSES TO PHQ QUESTIONS 1-9: 15
10. IF YOU CHECKED OFF ANY PROBLEMS, HOW DIFFICULT HAVE THESE PROBLEMS MADE IT FOR YOU TO DO YOUR WORK, TAKE CARE OF THINGS AT HOME, OR GET ALONG WITH OTHER PEOPLE: 0
8. MOVING OR SPEAKING SO SLOWLY THAT OTHER PEOPLE COULD HAVE NOTICED. OR THE OPPOSITE, BEING SO FIGETY OR RESTLESS THAT YOU HAVE BEEN MOVING AROUND A LOT MORE THAN USUAL: 3
7. TROUBLE CONCENTRATING ON THINGS, SUCH AS READING THE NEWSPAPER OR WATCHING TELEVISION: 0

## 2022-07-25 NOTE — Clinical Note
Call pt to see how she is doing. If still in a lot of pain, want her to come back in so can recheck urine and send culture since was not done at office visit.

## 2022-07-25 NOTE — PROGRESS NOTES
[address] PRIMARY CARE  [address] [place] [address]  Dept: [phone]  Dept Fax: [phone]: [phone]     MARY Tripp (: 1990) is a 33 y.o. female, Established patient, here for evaluation of the following chief complaint(s):  Diabetes (Diabetic follow up. Started on Metformin.) and Abdominal Pain (Severe abdominal pain for one week that radiates into her back. )      PCP:  MEENA Allison - CNP      Pt here for 3 month f/u after being diagnosed with diabetes and started on metformin in April. Eating better since last visit- more fresh fruits and veggies. More portion control, eating less. Pt has been having abdominal pain for a week. Was intermittent, but is constant for the last couple of days Feels like being stabbed on her left side. Will shoot into her back if sneezes or coughs. Is peeing normally, no blood or pain. No diarrhea or vomiting. Last ate around 9am.     Pt has concerns over sleep apnea. Reports snores and her signif other says she stops breathing during her sleep. She does mora daytime fatigue and obesity as well. Has never had a sleep study before. [place] Sleepiness Scale Chance of Dozing and Situation    0= No chance of dozing 1=Slight chance of dozing 2= Moderate chance of dozing 3=High chance of dozing      3 Sitting and Reading  1 Sitting inactive in a public place  3 Lying down to rest in the afternoon  3 Sitting quietly after lunch without alcohol  0 Watching TV  3 As a a passenger in a car for 1 hour without a break  0 Sitting and talking to someone  0 In a car, stopped for a few minutes in traffic    Total Score= 13    Neck Circumference= 15.75 inches        Review of Systems   Constitutional:  Positive for activity change and fatigue. Negative for appetite change, chills, diaphoresis, fever and unexpected weight change.    Respiratory:  Positive for apnea. Negative for cough, shortness of breath and wheezing. Snoring     Cardiovascular: Negative. Negative for chest pain, palpitations and leg swelling. Gastrointestinal:  Positive for abdominal pain. Negative for blood in stool, constipation, diarrhea, nausea and vomiting. Genitourinary:  Positive for flank pain. Negative for difficulty urinating and dysuria. Psychiatric/Behavioral: Negative. Negative for dysphoric mood. The patient is not nervous/anxious. Past Medical History:   Diagnosis Date    Asthma      Past Surgical History:   Procedure Laterality Date     SECTION      x's 2     Social History     Socioeconomic History    Marital status:      Spouse name: Not on file    Number of children: Not on file    Years of education: Not on file    Highest education level: Not on file   Occupational History    Not on file   Tobacco Use    Smoking status: Every Day     Packs/day: 1.00     Years: 10.00     Pack years: 10.00     Types: Cigars, Cigarettes    Smokeless tobacco: Never   Substance and Sexual Activity    Alcohol use: Yes     Alcohol/week: 2.0 standard drinks     Types: 2 Standard drinks or equivalent per week    Drug use: Never    Sexual activity: Not on file   Other Topics Concern    Not on file   Social History Narrative    Not on file     Social Determinants of Health     Financial Resource Strain: Medium Risk    Difficulty of Paying Living Expenses: Somewhat hard   Food Insecurity: Food Insecurity Present    Worried About [address] in the Last Year: Sometimes true    Ran Out of Food in the Last Year: Sometimes true   Transportation Needs: No Transportation Needs    Lack of Transportation (Medical): No    Lack of Transportation (Non-Medical):  No   Physical Activity: Not on file   Stress: Not on file   Social Connections: Not on file   Intimate Partner Violence: Not on file   Housing Stability: Not on file     Family History   Problem Relation Age of Onset Hypothyroidism Mother     Diabetes Father          age 36 MVA    Diabetes type 2  Father     Diabetes type 2  Paternal Grandmother         dec age 68    Diabetes type 2  Paternal Great Grandmother       Allergies   Allergen Reactions    Cat Hair Extract      Other reaction(s): Other: See Comments  Stuffy nose itchy throat    Other      SEASONAL  Stuffy nose     Prior to Admission medications    Medication Sig Start Date End Date Taking? Authorizing Provider   tamsulosin (FLOMAX) 0.4 MG capsule Take 1 capsule by mouth in the morning. 22  Yes Cornelious Shillings, APRN - CNP   Hyoscyamine Sulfate SL (LEVSIN/SL) 0.125 MG SUBL Place 1 tablet under the tongue 3 times daily as needed (abdominal pain) 22  Yes Cornelious Shillings, APRN - CNP   Semaglutide,0.25 or 0.5MG/DOS, (OZEMPIC, 0.25 OR 0.5 MG/DOSE,) 2 MG/1.5ML SOPN Inject 0.25mg subcut qWeek x2 weeks, then increase to 0.5mg subcut qWeek x4 weeks 22  Yes Cornelious Shillings, APRN - CNP   Semaglutide, 1 MG/DOSE, (OZEMPIC, 1 MG/DOSE,) 2 MG/1.5ML SOPN Inject 1 mg into the skin once a week 22  Yes Cornelious Shillings, APRN - CNP   metFORMIN (GLUCOPHAGE-XR) 500 MG extended release tablet take 2 tablets by mouth once daily with breakfast 22  Yes Cornelious Shillings, APRN - CNP   albuterol sulfate  (90 Base) MCG/ACT inhaler Inhale 2 puffs into the lungs every 4 hours as needed 10/21/21  Yes Historical Provider, MD   albuterol (PROVENTIL) (2.5 MG/3ML) 0.083% nebulizer solution Inhale 2.5 mg into the lungs every 4 hours as needed 10/21/21  Yes Historical Provider, MD                I have reviewed the patient's medical history in detail and updated the computerized patient record.       OBJECTIVE    Vitals:    22 1306   BP: 108/74   Site: Left Upper Arm   Position: Sitting   Cuff Size: Large Adult   Pulse: 88   Resp: 16   Temp: 97.7 °F (36.5 °C)   SpO2: (!) 88%   Weight: 253 lb 3.2 oz (114.9 kg)   Height: 5' 6\" (1.676 m)       Physical Exam  Vitals and nursing note reviewed. Constitutional:       General: She is not in acute distress. Appearance: Normal appearance. She is obese. Eyes:      General: No scleral icterus. Conjunctiva/sclera: Conjunctivae normal.   Cardiovascular:      Rate and Rhythm: Normal rate and regular rhythm. Pulses:           Dorsalis pedis pulses are 2+ on the right side and 2+ on the left side. Heart sounds: Normal heart sounds. Pulmonary:      Effort: Pulmonary effort is normal. No respiratory distress. Breath sounds: Normal breath sounds. Abdominal:      General: Bowel sounds are normal. There is no distension. Palpations: Abdomen is soft. Tenderness: There is abdominal tenderness (along left outer quadrants, both upper and lower into flank area). There is left CVA tenderness. There is no right CVA tenderness. Musculoskeletal:      Cervical back: Normal range of motion and neck supple. Feet:      Right foot:      Protective Sensation: 10 sites tested. 10 sites sensed. Skin integrity: Skin integrity normal.      Toenail Condition: Right toenails are normal.      Left foot:      Protective Sensation: 10 sites tested. 10 sites sensed. Skin integrity: Skin integrity normal.      Toenail Condition: Left toenails are normal.   Lymphadenopathy:      Cervical: No cervical adenopathy. Skin:     General: Skin is warm and dry. Neurological:      Mental Status: She is alert and oriented to person, place, and time. Psychiatric:         Mood and Affect: Mood normal.         Thought Content: Thought content normal.         ASSESSMENT/ PLAN    1. Controlled type 2 diabetes mellitus without complication, without long-term current use of insulin (HCC)  Chronic, improving.  A1c now 6.2% down from 6.5% with starting metformin  -battling weight still, will add ozempic to try and reduce sugars to normal range and promote weight loss which will help condition  - POCT glycosylated hemoglobin (Hb A1C)  - Semaglutide,0.25 or 0.5MG/DOS, (OZEMPIC, 0.25 OR 0.5 MG/DOSE,) 2 MG/1.5ML SOPN; Inject 0.25mg subcut qWeek x2 weeks, then increase to 0.5mg subcut qWeek x4 weeks  Dispense: 1 pen; Refill: 0  - Semaglutide, 1 MG/DOSE, (OZEMPIC, 1 MG/DOSE,) 2 MG/1.5ML SOPN; Inject 1 mg into the skin once a week  Dispense: 2 pen; Refill: 5  - Microalbumin / Creatinine Urine Ratio; Future  -  DIABETES FOOT EXAM    2. Morbid obesity (HCC)  - The standard range for ages 25 and older is >=18.5 and < 25 kg/m2. Your BMI today was above this range, this falls in the overweight obesity morbid obese category and there are medical benefits to weight loss. BMI monitoring is helful with identifying a weight problem that may be related to a medical condition, or may increase the risk for medical problems. Your BMI and weight management will be followed at subsequent visits with your provider and monitored for progress. GOAL; promoting lifestyle and diet changes in order to reach a healthy weight. - Semaglutide,0.25 or 0.5MG/DOS, (OZEMPIC, 0.25 OR 0.5 MG/DOSE,) 2 MG/1.5ML SOPN; Inject 0.25mg subcut qWeek x2 weeks, then increase to 0.5mg subcut qWeek x4 weeks  Dispense: 1 pen; Refill: 0    3. Moderate episode of recurrent major depressive disorder (HCC)  Chronic, not to goal today phq9 score 15. Is in a lot of pain. No new meds started. Will reeval at next visit to see how doing    4. Left flank pain  New, uncontrolled  - CT ABDOMEN PELVIS WO CONTRAST Additional Contrast? Radiologist Recommendation; Future, unremarkable. Notified pt and pelvic US ordered  - originally thought highly was renal stone so sent in levsin and flomax, discontinued both before she can take since no stone  - US PELVIS COMPLETE NON-OB TRANSABDOMINAL AND TRANSVAGINAL; Future  - POCT Urinalysis No Micro (Auto)    5. Microscopic hematuria  New, no signs infection but presence of blood.  CT abd/pelv negative for renal stone which was suspected  -no urine culture was sent by staff, if continues to have pain will need to do repeat UA with culture. Consider STD testing    6. Sleep apnea, unspecified type  Chronic, witness apnea. Daytime symptoms  - Amb External Referral To Sleep Medicine      On this date 7/25/2022 I have spent 45 minutes reviewing previous notes, test results and face to face with the patient discussing the diagnosis and importance of compliance with the treatment plan as well as documenting on the day of the visit. Return in about 3 months (around 10/25/2022) for dm recheck, mood recheck.      Electronically signed by:  MEENA Wagoner CNP   7/27/22

## 2022-07-26 ENCOUNTER — HOSPITAL ENCOUNTER (OUTPATIENT)
Dept: ULTRASOUND IMAGING | Age: 32
Discharge: HOME | End: 2022-07-28
Payer: COMMERCIAL

## 2022-07-26 DIAGNOSIS — R10.9 LEFT FLANK PAIN: ICD-10-CM

## 2022-07-26 PROCEDURE — 76856 US EXAM PELVIC COMPLETE: CPT

## 2022-07-27 ENCOUNTER — TELEPHONE (OUTPATIENT)
Dept: INTERNAL MEDICINE | Age: 32
End: 2022-07-27

## 2022-07-27 ASSESSMENT — ENCOUNTER SYMPTOMS: APNEA: 1

## 2022-07-27 NOTE — TELEPHONE ENCOUNTER
Pelvic ultrasound was unremarkable as well. If continues to have pain, please schedule a f/u appt and will have to re-eval and determine next step.

## 2022-07-30 DIAGNOSIS — R31.29 MICROSCOPIC HEMATURIA: ICD-10-CM

## 2022-07-30 DIAGNOSIS — R10.9 LEFT FLANK PAIN: ICD-10-CM

## 2022-08-01 RX ORDER — TAMSULOSIN HYDROCHLORIDE 0.4 MG/1
CAPSULE ORAL
Qty: 7 CAPSULE | Refills: 0 | OUTPATIENT
Start: 2022-08-01

## 2022-08-01 NOTE — TELEPHONE ENCOUNTER
Comments:     Last Office Visit (last PCP visit):   7/25/2022    Next Visit Date:  Future Appointments   Date Time Provider Arelis Dueñas   10/24/2022  9:00 AM Benja Goff, [address]       **If hasn't been seen in over a year OR hasn't followed up according to last diabetes/ADHD visit, make appointment for patient before sending refill to provider.     Rx requested:  Requested Prescriptions     Pending Prescriptions Disp Refills    tamsulosin (FLOMAX) 0.4 MG capsule [Pharmacy Med Name: TAMSULOSIN HCL 0.4 MG CAPSULE] 7 capsule 0     Sig: take 1 capsule by mouth every morning    hyoscyamine (LEVSIN/SL) 125 MCG sublingual tablet [Pharmacy Med Name: HYOSCYAMINE 0.125 MG TAB SL] 21 tablet      Sig: dissolve 1 tablet under the tongue three times a day if needed for abdominal pain

## 2022-08-17 ENCOUNTER — TELEPHONE (OUTPATIENT)
Dept: INTERNAL MEDICINE | Age: 32
End: 2022-08-17

## 2022-08-17 NOTE — TELEPHONE ENCOUNTER
I called patient and made her aware that she needs to call [facility] for her initial sleep apnea test. I gave her the number and she let me know that she will call them.

## 2022-10-05 ENCOUNTER — OFFICE VISIT (OUTPATIENT)
Dept: INTERNAL MEDICINE | Age: 32
End: 2022-10-05
Payer: COMMERCIAL

## 2022-10-05 VITALS
TEMPERATURE: 97.5 F | DIASTOLIC BLOOD PRESSURE: 72 MMHG | RESPIRATION RATE: 16 BRPM | HEIGHT: 66 IN | SYSTOLIC BLOOD PRESSURE: 106 MMHG | BODY MASS INDEX: 40.02 KG/M2 | WEIGHT: 249 LBS

## 2022-10-05 DIAGNOSIS — E11.9 CONTROLLED TYPE 2 DIABETES MELLITUS WITHOUT COMPLICATION, WITHOUT LONG-TERM CURRENT USE OF INSULIN (HCC): Primary | ICD-10-CM

## 2022-10-05 DIAGNOSIS — E66.01 MORBID OBESITY (HCC): ICD-10-CM

## 2022-10-05 LAB — HBA1C MFR BLD: 5.8 %

## 2022-10-05 PROCEDURE — 3044F HG A1C LEVEL LT 7.0%: CPT | Performed by: NURSE PRACTITIONER

## 2022-10-05 PROCEDURE — 83036 HEMOGLOBIN GLYCOSYLATED A1C: CPT | Performed by: NURSE PRACTITIONER

## 2022-10-05 PROCEDURE — 99214 OFFICE O/P EST MOD 30 MIN: CPT | Performed by: NURSE PRACTITIONER

## 2022-10-05 RX ORDER — TIRZEPATIDE 5 MG/.5ML
5 INJECTION, SOLUTION SUBCUTANEOUS WEEKLY
Qty: 4 ADJUSTABLE DOSE PRE-FILLED PEN SYRINGE | Refills: 2 | Status: SHIPPED | OUTPATIENT
Start: 2022-10-05

## 2022-10-05 SDOH — ECONOMIC STABILITY: FOOD INSECURITY: WITHIN THE PAST 12 MONTHS, THE FOOD YOU BOUGHT JUST DIDN'T LAST AND YOU DIDN'T HAVE MONEY TO GET MORE.: SOMETIMES TRUE

## 2022-10-05 SDOH — ECONOMIC STABILITY: FOOD INSECURITY: WITHIN THE PAST 12 MONTHS, YOU WORRIED THAT YOUR FOOD WOULD RUN OUT BEFORE YOU GOT MONEY TO BUY MORE.: SOMETIMES TRUE

## 2022-10-05 ASSESSMENT — ENCOUNTER SYMPTOMS
RESPIRATORY NEGATIVE: 1
VOMITING: 1
BLOOD IN STOOL: 0
NAUSEA: 1
COUGH: 0
SHORTNESS OF BREATH: 0
WHEEZING: 0

## 2022-10-05 ASSESSMENT — SOCIAL DETERMINANTS OF HEALTH (SDOH): HOW HARD IS IT FOR YOU TO PAY FOR THE VERY BASICS LIKE FOOD, HOUSING, MEDICAL CARE, AND HEATING?: SOMEWHAT HARD

## 2022-10-05 NOTE — PROGRESS NOTES
[address]  PRIMARY CARE  [address] [address] [place] [address]  Dept: [phone]  Dept Fax: [phone]: [phone]     MARY Rizvi (: 1990) is a 33 y.o. female, Established patient, here for evaluation of the following chief complaint(s):  Diabetes (Trouble with the medication, headaches, vomiting for 3 days after injection)      PCP:  MEENA Begum CNP      Patient is here for follow up on Diabetes. How often are you checking your blood sugars?  0 times per day   What are your average readings? none  Do you have low blood sugar readings/symptoms? no  Do you have high blood sugar readings/symptoms? no  Are you compliant with your diet? yes  Do you exercise? no  Are you compliant with your medications? yes  Are you having difficulty affording your medications? Yes, ozempic makes her vomit for 3 days after injection, just finished with 0.5mg dose and was to start 1mg tomorrow  Do you have any of the following symptoms? Vision problems? no  GI-Nausea/committing/bloating? yes  Lightheadedness? no  Paresthesias, Ulcerations or sores? No    Diabetic Exams up to date? Last a1c? Lab Results       Component                Value               Date                       LABA1C                   5.8                 10/05/2022            No results found for: EAG   Diabetic foot exam:   Urine Microalbumin: Lab Results       Component                Value               Date                       LABMICR                  1.40                2022             Last diabetic eye exam: unsure        Review of Systems   Constitutional: Negative. Negative for fatigue and unexpected weight change. Respiratory: Negative. Negative for cough, shortness of breath and wheezing. Cardiovascular: Negative. Negative for chest pain, palpitations and leg swelling.    Gastrointestinal:  Positive for nausea and vomiting. Negative for blood in stool. Psychiatric/Behavioral: Negative. Negative for dysphoric mood. The patient is not nervous/anxious. Past Medical History:   Diagnosis Date    Asthma      Past Surgical History:   Procedure Laterality Date     SECTION      x's 2     Social History     Socioeconomic History    Marital status:      Spouse name: Not on file    Number of children: Not on file    Years of education: Not on file    Highest education level: Not on file   Occupational History    Not on file   Tobacco Use    Smoking status: Every Day     Packs/day: 1.00     Years: 10.00     Pack years: 10.00     Types: Cigars, Cigarettes    Smokeless tobacco: Never   Substance and Sexual Activity    Alcohol use: Yes     Alcohol/week: 2.0 standard drinks     Types: 2 Standard drinks or equivalent per week    Drug use: Never    Sexual activity: Not on file   Other Topics Concern    Not on file   Social History Narrative    Not on file     Social Determinants of Health     Financial Resource Strain: Medium Risk    Difficulty of Paying Living Expenses: Somewhat hard   Food Insecurity: Food Insecurity Present    Worried About Running Out of Food in the Last Year: Sometimes true    Ran Out of Food in the Last Year: Sometimes true   Transportation Needs: Not on file   Physical Activity: Not on file   Stress: Not on file   Social Connections: Not on file   Intimate Partner Violence: Not on file   Housing Stability: Not on file     Family History   Problem Relation Age of Onset    Hypothyroidism Mother     Diabetes Father          age 36 MVA    Diabetes type 2  Father     Diabetes type 2  Paternal Grandmother         dec age 68    Diabetes type 2  Paternal Great Grandmother       Allergies   Allergen Reactions    Cat Hair Extract      Other reaction(s):  Other: See Comments  Stuffy nose itchy throat    Other      SEASONAL  Stuffy nose     Prior to Admission medications    Medication Sig Start Date End Date Taking? Authorizing Provider   Tirzepatide [facility]) 5 MG/0.5ML SOPN SC injection Inject 0.5 mLs into the skin once a week 10/5/22  Yes MEENA Lofton CNP   albuterol sulfate  (90 Base) MCG/ACT inhaler Inhale 2 puffs into the lungs every 4 hours as needed 10/21/21  Yes Historical Provider, MD   albuterol (PROVENTIL) (2.5 MG/3ML) 0.083% nebulizer solution Inhale 2.5 mg into the lungs every 4 hours as needed 10/21/21  Yes Historical Provider, MD   metFORMIN (GLUCOPHAGE-XR) 500 MG extended release tablet take 2 tablets by mouth once daily with breakfast  Patient not taking: Reported on 10/5/2022 7/18/22   MEENA Lofton CNP                I have reviewed the patient's medical history in detail and updated the computerized patient record. OBJECTIVE    Vitals:    10/05/22 1305   BP: 106/72   Site: Left Upper Arm   Position: Sitting   Cuff Size: Large Adult   Resp: 16   Temp: 97.5 °F (36.4 °C)   Weight: 249 lb (112.9 kg)   Height: 5' 6\" (1.676 m)       Physical Exam  Vitals and nursing note reviewed. Constitutional:       General: She is not in acute distress. Appearance: Normal appearance. Cardiovascular:      Rate and Rhythm: Normal rate and regular rhythm. Heart sounds: Normal heart sounds. Pulmonary:      Effort: Pulmonary effort is normal. No respiratory distress. Breath sounds: Normal breath sounds. Musculoskeletal:      Cervical back: Normal range of motion and neck supple. Lymphadenopathy:      Cervical: No cervical adenopathy. Skin:     General: Skin is warm and dry. Neurological:      Mental Status: She is alert and oriented to person, place, and time. Psychiatric:         Mood and Affect: Mood normal.         Thought Content: Thought content normal.         ASSESSMENT/ PLAN    1. Controlled type 2 diabetes mellitus without complication, without long-term current use of insulin (HCC)  Chronic, improved.  A1c down to 5.8 from 6.2 3 months ago with adding ozempic  -is having issues with vomiting after injection and still not losing any weight despite diet changes and med  -will stop ozempic and switch to mounjaro if insurance will cover  - POCT glycosylated hemoglobin (Hb A1C)  - Tirzepatide (MOUNJARO) 5 MG/0.5ML SOPN SC injection; Inject 0.5 mLs into the skin once a week  Dispense: 4 Adjustable Dose Pre-filled Pen Syringe; Refill: 2    2. Morbid obesity (HCC)  Chronic, unchanged. Switching diabetic med with hopes for better a1c and weight loss and reduced side effects  - Tirzepatide (MOUNJARO) 5 MG/0.5ML SOPN SC injection; Inject 0.5 mLs into the skin once a week  Dispense: 4 Adjustable Dose Pre-filled Pen Syringe; Refill: 2      Return in about 3 months (around 1/5/2023) for dm recheck.      Electronically signed by:  MEENA Shin CNP   10/5/22

## 2022-10-05 NOTE — PATIENT INSTRUCTIONS
Family and Housing Resources    [employer] Home [employer] of [address] [place] [address]  Call 211  or - www. [place]. [address] [place] [address])  Call - [phone]  www.Salonmeister. intelworks    [facility]  3684 [facility] # [address]  [address] 82  [address]  [phone] /[phone]    Medicaid Application  Https://benefits. [place].gov/  [phone]    Home Energy Assistance Programs (HEAP)  [address]. [facility], 1001 [facility]  [phone]    [facility] ( FDC)  1925 [facility], 1850 [facility] (FDC)  [address], 1850 [facility]  [address] [facility]  www. Light Blue [employer]ics    [employer]  [address] [place] [address], [facility] Street  [address] [facility] for [facility] - Long Learning  [address] [place] [address] 0648 [facility] at 3001 [facility]   Food and Meal Resources    [employer] Home [employer] of [address] [place] [address]  Call 211 or  www. FreshOffice    [facility] Region HospitalBank. 50 [facility])  Call - [phone]  www.Salonmeister. net      [employer] / Home [employer] on [facility]  [address] Kishore Paul[address]   [phone]  *regular & [address]. 1925 [address], [address]  [phone]  *regular & special diets  60+ [facility] on [address]  1400 [facility]. [address]  [phone], ext. [phone] [facility] on 7150 [facility].   [facility], 400 [facility]  [phone]  *regular & special diets  [place] [address] Vivienne ashley    [address] [place] [address] on [facility] 40  [address] #[address]  [place], [place] Truong Patricia 1154 100 [facility], [address] [place] [address] Road  [phone]  [address] only    1000 Reyna Jacinto on [address]. [place]. [place], [address]  [phone]  61 + and/or disables     [address] [facility])  Call - [phone]  www.Mom Made Foods    [employer]ewayne [employer] of 2175 [facility]  Call 211 or  wwwAgile Energy 211KoalaDeal. [facility] High[facility] Healthcare [address]. [place], [address] [facility]  [phone]  *adults only with no insurance    [address]  1815 [facility] [place], [facility] Street  [phone]    Medicaid Application  https://benefits. [place].gov/  2-657-983-744-037-1229      [facility] [address] [place] [address], 1266 [facility]  [phone]    Patient Assistance Programs(PAP)  www.needymeds. Your Energy  *search by medication to see if assistance is available   Support Services     D[employer]ne [employer] of [facility] or  [address]. 211St[facility] Centerain. [address] [place] [address])  Call - [phone]  www.Mom Made Foods    [employer]. of [place] [address] [address]. [place], [address] [place] [address]  [phone]    ROWANOKZ  KANEIZ : [phone]  [place] : [phone]   [place]arson : [phone]   Crisis Hotline : [address]  [address] [facility] [address]  [place], [address]  [address]  310 Nikolaspatricia HowellLyndon arroyoSuburban Community [facility]  [phone]    [facility] Counseling  [address] Mee De La Lesiae [facility], [facility] [address]  [place], [address]  1717 [facility] # 717 [facility] [address] [place] [address], [facility]0 [address], [address] [place] [address]    Alcoholics Anonymous  Randyshirindomingo : [phone]  Coos : [phone]  24hr Hotline [phone]    [address] [place] [address] and Drug Abuse Services [facility])  Shabana Orour[address] 91  [facility], [address] [address] Agency [facility])  Call - [phone]  www.caa. Se[employer]    [employer]ew[employer]ne [employer] of [address] [place] [address]  Call 211 or  www. 211lorain. [place]      Provide A Ride  [phone]    Safe and Reliable Cab  [address].  Non-Emergency:  [address]  803 [facility]. [address]  [phone]  Ja Montes De O[facility] on [address] [address]  [phone]  Age 65+ limited tranportation area. 24 hour notice required.

## 2022-10-07 RX ORDER — TIRZEPATIDE 2.5 MG/.5ML
INJECTION, SOLUTION SUBCUTANEOUS
Qty: 4 ADJUSTABLE DOSE PRE-FILLED PEN SYRINGE | Refills: 0 | COMMUNITY
Start: 2022-10-07

## 2022-10-22 DIAGNOSIS — E11.9 CONTROLLED TYPE 2 DIABETES MELLITUS WITHOUT COMPLICATION, WITHOUT LONG-TERM CURRENT USE OF INSULIN (HCC): ICD-10-CM

## 2022-10-22 NOTE — TELEPHONE ENCOUNTER
Comments:     Last Office Visit (last PCP visit):   10/5/2022    Next Visit Date:  Future Appointments   Date Time Provider Arelis Dueñas   1/9/2023  8:00 AM Teetee Curry, [address]       **If hasn't been seen in over a year OR hasn't followed up according to last diabetes/ADHD visit, make appointment for patient before sending refill to provider.     Rx requested:  Requested Prescriptions     Pending Prescriptions Disp Refills    metFORMIN (GLUCOPHAGE-XR) 500 MG extended release tablet [Pharmacy Med Name: METFORMIN HCL  MG TABLET] 90 tablet 1     Sig: take 2 tablets by mouth once daily with breakfast

## 2022-10-24 RX ORDER — METFORMIN HYDROCHLORIDE 500 MG/1
TABLET, EXTENDED RELEASE ORAL
Qty: 90 TABLET | Refills: 1 | Status: SHIPPED | OUTPATIENT
Start: 2022-10-24

## 2022-11-08 ENCOUNTER — TELEPHONE (OUTPATIENT)
Dept: INTERNAL MEDICINE | Age: 32
End: 2022-11-08

## 2022-11-08 NOTE — TELEPHONE ENCOUNTER
I tried to do a prior authorization for patient's Carrie Chicas but am unable to so because patient no longer has insurance.

## 2022-11-09 NOTE — TELEPHONE ENCOUNTER
I spoke with Mary Mercado who let me know about [facility]. I called patient and made her aware and she is going to register with them to get the medication.

## 2022-11-09 NOTE — TELEPHONE ENCOUNTER
Please reach out to StyleFeeder at [phone]. He is the rep for mounjaro that we work with. Maybe he could advise if he thinks she could still get it with the coupon card- I did give her the card.

## 2023-02-17 NOTE — TELEPHONE ENCOUNTER
I called patient's insurance to do a PA for a CT of the abdomen and pelvis. Per automated system, no PA is required. Reference #[id number]. I called [facility] and pt was scheduled for today. She may leave if results are found and Sukhwinder James will call her if needed.
done

## 2023-05-11 ENCOUNTER — OFFICE VISIT (OUTPATIENT)
Dept: INTERNAL MEDICINE | Age: 33
End: 2023-05-11

## 2023-05-11 VITALS
DIASTOLIC BLOOD PRESSURE: 70 MMHG | RESPIRATION RATE: 16 BRPM | HEART RATE: 88 BPM | BODY MASS INDEX: 40.05 KG/M2 | WEIGHT: 249.2 LBS | OXYGEN SATURATION: 98 % | SYSTOLIC BLOOD PRESSURE: 110 MMHG | HEIGHT: 66 IN

## 2023-05-11 DIAGNOSIS — F33.1 MODERATE EPISODE OF RECURRENT MAJOR DEPRESSIVE DISORDER (HCC): ICD-10-CM

## 2023-05-11 DIAGNOSIS — E11.9 CONTROLLED TYPE 2 DIABETES MELLITUS WITHOUT COMPLICATION, WITHOUT LONG-TERM CURRENT USE OF INSULIN (HCC): Primary | ICD-10-CM

## 2023-05-11 DIAGNOSIS — E66.01 MORBID OBESITY (HCC): ICD-10-CM

## 2023-05-11 DIAGNOSIS — I87.8 VENOUS STASIS SYNDROME: ICD-10-CM

## 2023-05-11 LAB — HBA1C MFR BLD: 6 %

## 2023-05-11 PROCEDURE — 3044F HG A1C LEVEL LT 7.0%: CPT | Performed by: NURSE PRACTITIONER

## 2023-05-11 PROCEDURE — 99214 OFFICE O/P EST MOD 30 MIN: CPT | Performed by: NURSE PRACTITIONER

## 2023-05-11 RX ORDER — METFORMIN HYDROCHLORIDE 500 MG/1
1000 TABLET, EXTENDED RELEASE ORAL
Qty: 360 TABLET | Refills: 3 | Status: SHIPPED | OUTPATIENT
Start: 2023-05-11

## 2023-05-11 SDOH — ECONOMIC STABILITY: FOOD INSECURITY: WITHIN THE PAST 12 MONTHS, THE FOOD YOU BOUGHT JUST DIDN'T LAST AND YOU DIDN'T HAVE MONEY TO GET MORE.: NEVER TRUE

## 2023-05-11 SDOH — ECONOMIC STABILITY: FOOD INSECURITY: WITHIN THE PAST 12 MONTHS, YOU WORRIED THAT YOUR FOOD WOULD RUN OUT BEFORE YOU GOT MONEY TO BUY MORE.: NEVER TRUE

## 2023-05-11 SDOH — ECONOMIC STABILITY: INCOME INSECURITY: HOW HARD IS IT FOR YOU TO PAY FOR THE VERY BASICS LIKE FOOD, HOUSING, MEDICAL CARE, AND HEATING?: VERY HARD

## 2023-05-11 SDOH — ECONOMIC STABILITY: HOUSING INSECURITY
IN THE LAST 12 MONTHS, WAS THERE A TIME WHEN YOU DID NOT HAVE A STEADY PLACE TO SLEEP OR SLEPT IN A SHELTER (INCLUDING NOW)?: NO

## 2023-05-11 ASSESSMENT — ENCOUNTER SYMPTOMS
COLOR CHANGE: 0
RESPIRATORY NEGATIVE: 1
SHORTNESS OF BREATH: 0
WHEEZING: 0
COUGH: 0

## 2023-05-11 ASSESSMENT — PATIENT HEALTH QUESTIONNAIRE - PHQ9
SUM OF ALL RESPONSES TO PHQ QUESTIONS 1-9: 11
SUM OF ALL RESPONSES TO PHQ QUESTIONS 1-9: 11
7. TROUBLE CONCENTRATING ON THINGS, SUCH AS READING THE NEWSPAPER OR WATCHING TELEVISION: 1
1. LITTLE INTEREST OR PLEASURE IN DOING THINGS: 0
6. FEELING BAD ABOUT YOURSELF - OR THAT YOU ARE A FAILURE OR HAVE LET YOURSELF OR YOUR FAMILY DOWN: 0
SUM OF ALL RESPONSES TO PHQ QUESTIONS 1-9: 11
4. FEELING TIRED OR HAVING LITTLE ENERGY: 3
SUM OF ALL RESPONSES TO PHQ QUESTIONS 1-9: 11
9. THOUGHTS THAT YOU WOULD BE BETTER OFF DEAD, OR OF HURTING YOURSELF: 0
5. POOR APPETITE OR OVEREATING: 2
3. TROUBLE FALLING OR STAYING ASLEEP: 2
8. MOVING OR SPEAKING SO SLOWLY THAT OTHER PEOPLE COULD HAVE NOTICED. OR THE OPPOSITE, BEING SO FIGETY OR RESTLESS THAT YOU HAVE BEEN MOVING AROUND A LOT MORE THAN USUAL: 3
10. IF YOU CHECKED OFF ANY PROBLEMS, HOW DIFFICULT HAVE THESE PROBLEMS MADE IT FOR YOU TO DO YOUR WORK, TAKE CARE OF THINGS AT HOME, OR GET ALONG WITH OTHER PEOPLE: 1
SUM OF ALL RESPONSES TO PHQ9 QUESTIONS 1 & 2: 0
2. FEELING DOWN, DEPRESSED OR HOPELESS: 0

## 2023-05-11 NOTE — PROGRESS NOTES
lb 3.2 oz (113 kg)   Height: 5' 6\" (1.676 m)       Physical Exam  Vitals and nursing note reviewed. Constitutional:       General: She is not in acute distress. Appearance: Normal appearance. She is obese. Cardiovascular:      Rate and Rhythm: Normal rate and regular rhythm. Heart sounds: Normal heart sounds. Pulmonary:      Effort: Pulmonary effort is normal. No respiratory distress. Breath sounds: Normal breath sounds. Musculoskeletal:         General: Tenderness (to bilateral lower legs with palpation.) present. No swelling. Normal range of motion. Cervical back: Normal range of motion and neck supple. Right lower leg: No edema. Left lower leg: No edema. Lymphadenopathy:      Cervical: No cervical adenopathy. Skin:     General: Skin is warm and dry. Neurological:      Mental Status: She is alert and oriented to person, place, and time. Psychiatric:         Mood and Affect: Mood normal.         Thought Content: Thought content normal.         ASSESSMENT/ PLAN    1. Controlled type 2 diabetes mellitus without complication, without long-term current use of insulin (HCC)  Chronic, stable with a1c 6%. -no longer has insurance, so glp1 is unaffordable. Ozempic caused severe gi side effects ,couldn't get mounjaro. Maybe trulicity would help with weight loss if would revisit. Will increase metformin to 1 g BID if can tolerate  - POCT glycosylated hemoglobin (Hb A1C)  - metFORMIN (GLUCOPHAGE-XR) 500 MG extended release tablet; Take 2 tablets by mouth 2 times daily (before meals)  Dispense: 360 tablet; Refill: 3    2. Moderate episode of recurrent major depressive disorder (HCC)  Chronic, slightly decreased d/t life stressors. Declines med at this time, but if gets worse she agrees to f/u    3. Morbid obesity (HCC)  Chronic, stable. The standard range for ages 25 and older is >=18.5 and < 25 kg/m2.  Your BMI today was above this range, this falls in the overweight obesity

## 2023-05-11 NOTE — PATIENT INSTRUCTIONS
[facility]  and Dentistry   What they offer: LLCH&D discounts fees for qualifying insured and uninsured persons. We can assist you in signing up for Medicaid, Medicare, and [employer]. They offer 4 locations in [facility], 2 locations in [facility] and 1 in [address] [place] [address],[address]. Phone Number: [phone]  Website: Nobl.ee. Adri 19:  What they offer: We provide health care services to the uninsured and underinsured. From providing quality care to connecting patients to critical community resources, our patients and their needs are our highest priority. Phone number: [phone]  Website: https://Abiquo.Radcom  211: What they offer: Help find lower cost options for phone or internet as well as help paying utility bills. Phone Number: 617  Website: https://[employer].Cloudmark/    MEDICATIONS:   Good Rx  What they offer: Good Rx tracks prescription drug prices and provides free drug coupons for discounts on medications. Website: VipAnalysis.is. com/  NeedyMeds:  What they offer: NeedyMeds offers free information on medications and healthcare cost savings programs including prescription assistance programs, coupons, and discount programs. Helpline: 307.900.3363  Website: PaymentBack.NeoStem. org  RX Assist:  What they offer: Information about free and low-cost medicine programs. Website: 816 035 925 $4 Prescription Program:  What they offer: Prescription Program includes up to a 30-day supply for $4 and a 90-day supply for $10 of some covered generic drugs at commonly prescribed dosages  Website: Shady  [facility] Drug Repository:  Phone Number: [phone], Angela Duffy    Need additional resources? Find Help https://www. findhelp.org

## 2023-11-22 ENCOUNTER — OFFICE VISIT (OUTPATIENT)
Dept: INTERNAL MEDICINE | Age: 33
End: 2023-11-22

## 2023-11-22 VITALS
BODY MASS INDEX: 38.76 KG/M2 | HEIGHT: 66 IN | DIASTOLIC BLOOD PRESSURE: 76 MMHG | SYSTOLIC BLOOD PRESSURE: 120 MMHG | RESPIRATION RATE: 16 BRPM | HEART RATE: 86 BPM | WEIGHT: 241.2 LBS

## 2023-11-22 DIAGNOSIS — F33.1 MODERATE EPISODE OF RECURRENT MAJOR DEPRESSIVE DISORDER (HCC): ICD-10-CM

## 2023-11-22 DIAGNOSIS — E11.9 CONTROLLED TYPE 2 DIABETES MELLITUS WITHOUT COMPLICATION, WITHOUT LONG-TERM CURRENT USE OF INSULIN (HCC): ICD-10-CM

## 2023-11-22 DIAGNOSIS — J45.20 MILD INTERMITTENT ASTHMA WITHOUT COMPLICATION: ICD-10-CM

## 2023-11-22 DIAGNOSIS — N93.9 ABNORMAL UTERINE BLEEDING: ICD-10-CM

## 2023-11-22 DIAGNOSIS — M54.32 LEFT SIDED SCIATICA: ICD-10-CM

## 2023-11-22 DIAGNOSIS — E66.01 CLASS 2 SEVERE OBESITY DUE TO EXCESS CALORIES WITH SERIOUS COMORBIDITY AND BODY MASS INDEX (BMI) OF 38.0 TO 38.9 IN ADULT (HCC): ICD-10-CM

## 2023-11-22 DIAGNOSIS — E11.9 CONTROLLED TYPE 2 DIABETES MELLITUS WITHOUT COMPLICATION, WITHOUT LONG-TERM CURRENT USE OF INSULIN (HCC): Primary | ICD-10-CM

## 2023-11-22 PROBLEM — E66.812 CLASS 2 SEVERE OBESITY DUE TO EXCESS CALORIES WITH SERIOUS COMORBIDITY AND BODY MASS INDEX (BMI) OF 39.0 TO 39.9 IN ADULT: Status: ACTIVE | Noted: 2022-04-18

## 2023-11-22 LAB
CREAT UR-MCNC: 343.8 MG/DL
HBA1C MFR BLD: 5.5 %
MICROALBUMIN UR-MCNC: 1.7 MG/DL
MICROALBUMIN/CREAT UR-RTO: 4.9 MG/G (ref 0–30)

## 2023-11-22 PROCEDURE — 99213 OFFICE O/P EST LOW 20 MIN: CPT | Performed by: NURSE PRACTITIONER

## 2023-11-22 PROCEDURE — 83036 HEMOGLOBIN GLYCOSYLATED A1C: CPT | Performed by: NURSE PRACTITIONER

## 2023-11-22 PROCEDURE — 3044F HG A1C LEVEL LT 7.0%: CPT | Performed by: NURSE PRACTITIONER

## 2023-11-22 RX ORDER — ALBUTEROL SULFATE 90 UG/1
2 AEROSOL, METERED RESPIRATORY (INHALATION) EVERY 4 HOURS PRN
Qty: 18 G | Refills: 1 | Status: SHIPPED | OUTPATIENT
Start: 2023-11-22

## 2023-11-22 NOTE — PROGRESS NOTES
[address] PRIMARY CARE  [facility] [address]  Dept: [phone]  Dept Fax: [phone]: [phone]     MARY Russell (: 1990) is a 29 y.o. female, Established patient, here for evaluation of the following chief complaint(s):  Diabetes (6 month diabetic follow up. At last visit,Metformin was increased to 1000mg bid. Patient checks her sugar once a day. Sugar is around 124. Metformin has caused her lower back pain,abnormal menses, loss of appetite. Had three periods in one month. Did have one instance where she forgot to take her medication and she experienced hand numbness. )      PCP:  MEENA Hogue - CNP      Patient is here for follow up on Diabetes. How often are you checking your blood sugars? 1 times per day   What are your average readings? 124 fasting  Do you have low blood sugar readings/symptoms? no  Do you have high blood sugar readings/symptoms? no  Are you compliant with your diet? yes  Do you exercise? no  Are you compliant with your medications? No-   Are you having difficulty affording your medications? no  Do you have any of the following symptoms? Vision problems? no  GI-Nausea/committing/bloating? no  Lightheadedness? no  Paresthesias, Ulcerations or sores? No    Diabetic Exams up to date? Last a1c? Lab Results       Component                Value               Date                       LABA1C                   5.5                 2023            No results found for: \"EAG\"   Diabetic foot exam:  today  Urine Microalbumin: No results found for: \"SYLQ52XUA\"   Last diabetic eye exam:       Had 3 periods last month which is not normal for her. 10/5-10/10, then 10/10-10/14, then 10/23-10/27. Has felt like having period cramps for the last month nonstop. Hasn't had a pap in 8 yrs or more. She is .      She has had a lot of left side back

## 2023-11-29 ENCOUNTER — OFFICE VISIT (OUTPATIENT)
Dept: INTERNAL MEDICINE | Age: 33
End: 2023-11-29

## 2023-11-29 VITALS
HEART RATE: 95 BPM | HEIGHT: 66 IN | RESPIRATION RATE: 16 BRPM | BODY MASS INDEX: 39.25 KG/M2 | OXYGEN SATURATION: 98 % | SYSTOLIC BLOOD PRESSURE: 120 MMHG | WEIGHT: 244.2 LBS | DIASTOLIC BLOOD PRESSURE: 80 MMHG

## 2023-11-29 DIAGNOSIS — Z12.4 SCREENING FOR CERVICAL CANCER: ICD-10-CM

## 2023-11-29 DIAGNOSIS — Z01.419 WELL WOMAN EXAM WITH ROUTINE GYNECOLOGICAL EXAM: Primary | ICD-10-CM

## 2023-11-29 PROCEDURE — 99395 PREV VISIT EST AGE 18-39: CPT | Performed by: NURSE PRACTITIONER

## 2023-11-29 NOTE — PROGRESS NOTES
2023    Moi López (:  1990) is a 29 y.o. female, here for a preventive medicine evaluation. Well Woman HPI  Patient here today for wellness exam. Last pap was 8 yrs ago and results were normal. Has no history of abnormal results in past. Declines STD testing, has had same sex partner for 13 yrs. Denies vaginal discharge, odor and pain with sex. Reports bleeding was very abnormal last month, had several episodes of bleeding. Has not heard [facility] from [facility] to schedule pelvic US (was ordered last week). LMP was 2 weeks ago. Left lower back pain has improved some with the stretches given to do at home. Knows about self breast exams and does them monthly without any concerns. Has never had a mammogram and denies family hx of breast cancer. Patient Active Problem List   Diagnosis    Mild intermittent asthma without complication    Controlled type 2 diabetes mellitus without complication, without long-term current use of insulin ([facility])    Class 2 severe obesity due to excess calories with serious comorbidity and body mass index (BMI) of 38.0 to 38.9 in adult ([facility])    Moderate episode of recurrent major depressive disorder ([facility])    Sleep apnea    Venous stasis syndrome       Review of Systems   Constitutional:         See HPI for ROS         Prior to Visit Medications    Medication Sig Taking? Authorizing Provider   albuterol sulfate HFA (PROVENTIL;VENTOLIN;PROAIR) 108 (90 Base) MCG/ACT inhaler Inhale 2 puffs into the lungs every 4 hours as needed for Wheezing or Shortness of Breath Yes Aida Holt APRN - CNP   metFORMIN (GLUCOPHAGE-XR) 500 MG extended release tablet Take 2 tablets by mouth 2 times daily (before meals) Yes Aida Holt APRN - CNP   albuterol (PROVENTIL) (2.5 MG/3ML) 0.083% nebulizer solution Inhale 3 mLs into the lungs every 4 hours as needed  Provider, MD Lanie        Allergies   Allergen Reactions    Cat Hair Extract      Other reaction(s):  Other: See

## 2023-11-30 LAB — HPV16+18+H RISK 12 DNA SPEC-IMP: NORMAL

## 2023-12-08 LAB
HPV HR 12 DNA SPEC QL NAA+PROBE: NOT DETECTED
HPV16 DNA SPEC QL NAA+PROBE: NOT DETECTED
HPV16+18+H RISK 12 DNA SPEC-IMP: NORMAL
HPV18 DNA SPEC QL NAA+PROBE: NOT DETECTED

## 2023-12-09 ENCOUNTER — TELEPHONE (OUTPATIENT)
Dept: INTERNAL MEDICINE | Age: 33
End: 2023-12-09

## 2024-04-22 ENCOUNTER — OFFICE VISIT (OUTPATIENT)
Dept: INTERNAL MEDICINE | Age: 34
End: 2024-04-22
Payer: COMMERCIAL

## 2024-04-22 VITALS
HEART RATE: 82 BPM | RESPIRATION RATE: 16 BRPM | OXYGEN SATURATION: 96 % | HEIGHT: 66 IN | BODY MASS INDEX: 42.07 KG/M2 | SYSTOLIC BLOOD PRESSURE: 130 MMHG | DIASTOLIC BLOOD PRESSURE: 84 MMHG | WEIGHT: 261.8 LBS

## 2024-04-22 DIAGNOSIS — Z23 NEED FOR VACCINATION: ICD-10-CM

## 2024-04-22 DIAGNOSIS — E66.01 CLASS 2 SEVERE OBESITY DUE TO EXCESS CALORIES WITH SERIOUS COMORBIDITY AND BODY MASS INDEX (BMI) OF 39.0 TO 39.9 IN ADULT (HCC): ICD-10-CM

## 2024-04-22 DIAGNOSIS — F33.1 MODERATE EPISODE OF RECURRENT MAJOR DEPRESSIVE DISORDER (HCC): ICD-10-CM

## 2024-04-22 DIAGNOSIS — E11.9 CONTROLLED TYPE 2 DIABETES MELLITUS WITHOUT COMPLICATION, WITHOUT LONG-TERM CURRENT USE OF INSULIN (HCC): Primary | ICD-10-CM

## 2024-04-22 LAB — HBA1C MFR BLD: 6.8 %

## 2024-04-22 PROCEDURE — 83036 HEMOGLOBIN GLYCOSYLATED A1C: CPT | Performed by: NURSE PRACTITIONER

## 2024-04-22 PROCEDURE — 3044F HG A1C LEVEL LT 7.0%: CPT | Performed by: NURSE PRACTITIONER

## 2024-04-22 PROCEDURE — 90677 PCV20 VACCINE IM: CPT | Performed by: NURSE PRACTITIONER

## 2024-04-22 PROCEDURE — 90471 IMMUNIZATION ADMIN: CPT | Performed by: NURSE PRACTITIONER

## 2024-04-22 PROCEDURE — 99214 OFFICE O/P EST MOD 30 MIN: CPT | Performed by: NURSE PRACTITIONER

## 2024-04-22 RX ORDER — TIRZEPATIDE 5 MG/.5ML
5 INJECTION, SOLUTION SUBCUTANEOUS WEEKLY
Qty: 2 ML | Refills: 2 | Status: SHIPPED | OUTPATIENT
Start: 2024-04-22

## 2024-04-22 RX ORDER — TIRZEPATIDE 2.5 MG/.5ML
2.5 INJECTION, SOLUTION SUBCUTANEOUS WEEKLY
Qty: 2 ML | Refills: 0 | Status: SHIPPED | OUTPATIENT
Start: 2024-04-22

## 2024-04-22 ASSESSMENT — PATIENT HEALTH QUESTIONNAIRE - PHQ9
SUM OF ALL RESPONSES TO PHQ QUESTIONS 1-9: 12
9. THOUGHTS THAT YOU WOULD BE BETTER OFF DEAD, OR OF HURTING YOURSELF: NOT AT ALL
1. LITTLE INTEREST OR PLEASURE IN DOING THINGS: NOT AT ALL
2. FEELING DOWN, DEPRESSED OR HOPELESS: SEVERAL DAYS
SUM OF ALL RESPONSES TO PHQ QUESTIONS 1-9: 12
4. FEELING TIRED OR HAVING LITTLE ENERGY: SEVERAL DAYS
SUM OF ALL RESPONSES TO PHQ QUESTIONS 1-9: 12
8. MOVING OR SPEAKING SO SLOWLY THAT OTHER PEOPLE COULD HAVE NOTICED. OR THE OPPOSITE, BEING SO FIGETY OR RESTLESS THAT YOU HAVE BEEN MOVING AROUND A LOT MORE THAN USUAL: NEARLY EVERY DAY
10. IF YOU CHECKED OFF ANY PROBLEMS, HOW DIFFICULT HAVE THESE PROBLEMS MADE IT FOR YOU TO DO YOUR WORK, TAKE CARE OF THINGS AT HOME, OR GET ALONG WITH OTHER PEOPLE: SOMEWHAT DIFFICULT
5. POOR APPETITE OR OVEREATING: MORE THAN HALF THE DAYS
6. FEELING BAD ABOUT YOURSELF - OR THAT YOU ARE A FAILURE OR HAVE LET YOURSELF OR YOUR FAMILY DOWN: NOT AT ALL
SUM OF ALL RESPONSES TO PHQ QUESTIONS 1-9: 12
3. TROUBLE FALLING OR STAYING ASLEEP: MORE THAN HALF THE DAYS
SUM OF ALL RESPONSES TO PHQ9 QUESTIONS 1 & 2: 1
7. TROUBLE CONCENTRATING ON THINGS, SUCH AS READING THE NEWSPAPER OR WATCHING TELEVISION: NEARLY EVERY DAY

## 2024-04-22 NOTE — PROGRESS NOTES
After obtaining consent, and per orders of Mayda ROBLEDO CNP, injection of Prevnar 20 given in Left deltoid by ALAINA LOPEZ MA. Patient instructed to remain in clinic for 20 minutes afterwards, and to report any adverse reaction to me immediately.

## 2024-04-22 NOTE — PROGRESS NOTES
[facility] PRIMARY CARE  [address] [id number]  Dept: [phone]  Dept Fax: [phone]  Loc: [phone]     MARY Lester (: 1990) is a 34 y.o. female, Established patient, here for evaluation of the following chief complaint(s):  Diabetes (4 month diabetic follow up. Continues with Metformin. Patient stopped Metformin about a week ago. Patient believes that it is causing insomnia and made her have fatigue. Patient does check her sugar at home PRN. Last week was 121. )      PCP:  Mayda Holt APRN - CNP      Patient is here for follow up on Diabetes.   How often are you checking your blood sugars?  0 times per day   What are your average readings? Last week was 121  Do you have low blood sugar readings/symptoms? no  Do you have high blood sugar readings/symptoms? no  Are you compliant with your diet? yes  Do you exercise? Yes- has been in the gym for last 3 weeks several days a week  Are you compliant with your medications? No- quit metformin a week ago d/t concerns it was causing insomnia and fatigue. Just not feeling well on it. Has been on it for over a year.   Are you having difficulty affording your medications? No- now has insurance  Do you have any of the following symptoms?  Vision problems? no  GI-Nausea/committing/bloating? no  Lightheadedness? no  Paresthesias, Ulcerations or sores? No  Weight: Up 20 lbs from visit 6 months ago    Diabetic Exams up to date?  Last a1c?  Lab Results       Component                Value               Date                       LABA1C                   6.8                 2024                LABA1C                   5.5                 2023             Diabetic foot exam:   Urine Microalbumin:   Last diabetic eye exam: overdue      Depression: PHQ9=12. Life stressors, was taking 2 heavy college courses this semester working

## 2024-04-26 ENCOUNTER — TELEPHONE (OUTPATIENT)
Dept: INTERNAL MEDICINE | Age: 34
End: 2024-04-26

## 2024-04-26 NOTE — TELEPHONE ENCOUNTER
Patient called in today stating she talked with her pharmacy and they need our office to call them to give a verbal for her Mounjaro. States we  need to call Rashel at [phone].

## 2024-05-21 NOTE — TELEPHONE ENCOUNTER
Patient made aware. Pared With?: 15 blade Consent: The patient's consent was obtained including but not limited to risks of crusting, scabbing, blistering, scarring, darker or lighter pigmentary change, recurrence, incomplete removal and infection. Add 52 Modifier (Optional): no Medical Necessity Clause: This procedure was medically necessary because the lesions that were treated were: Spray Paint Text: The liquid nitrogen was applied to the skin utilizing a spray paint frosting technique. Number Of Freeze-Thaw Cycles: 3 freeze-thaw cycles Duration Of Freeze Thaw-Cycle (Seconds): 5-10 Show Applicator Variable?: Yes Post-Care Instructions: I reviewed with the patient in detail post-care instructions. Patient is to wear sunprotection, and avoid picking at any of the treated lesions. Pt may apply Vaseline to crusted or scabbing areas. Medical Necessity Information: It is in your best interest to select a reason for this procedure from the list below. All of these items fulfill various CMS LCD requirements except the new and changing color options. Detail Level: Detailed

## 2024-06-27 DIAGNOSIS — E11.9 CONTROLLED TYPE 2 DIABETES MELLITUS WITHOUT COMPLICATION, WITHOUT LONG-TERM CURRENT USE OF INSULIN (HCC): ICD-10-CM

## 2024-06-27 DIAGNOSIS — J45.20 MILD INTERMITTENT ASTHMA WITHOUT COMPLICATION: ICD-10-CM

## 2024-06-27 DIAGNOSIS — E66.01 CLASS 2 SEVERE OBESITY DUE TO EXCESS CALORIES WITH SERIOUS COMORBIDITY AND BODY MASS INDEX (BMI) OF 39.0 TO 39.9 IN ADULT (HCC): ICD-10-CM

## 2024-06-27 RX ORDER — ALBUTEROL SULFATE 90 UG/1
2 AEROSOL, METERED RESPIRATORY (INHALATION) EVERY 4 HOURS PRN
Qty: 18 G | Refills: 1 | Status: SHIPPED | OUTPATIENT
Start: 2024-06-27

## 2024-06-27 RX ORDER — ALBUTEROL SULFATE 2.5 MG/3ML
2.5 SOLUTION RESPIRATORY (INHALATION) EVERY 4 HOURS PRN
Qty: 120 EACH | Refills: 1 | Status: SHIPPED | OUTPATIENT
Start: 2024-06-27 | End: 2024-06-28

## 2024-06-27 RX ORDER — TIRZEPATIDE 2.5 MG/.5ML
2.5 INJECTION, SOLUTION SUBCUTANEOUS WEEKLY
Qty: 2 ML | Refills: 0 | Status: SHIPPED | OUTPATIENT
Start: 2024-06-27

## 2024-06-27 RX ORDER — TIRZEPATIDE 5 MG/.5ML
5 INJECTION, SOLUTION SUBCUTANEOUS WEEKLY
Qty: 2 ML | Refills: 2 | Status: SHIPPED | OUTPATIENT
Start: 2024-06-27

## 2024-06-27 NOTE — TELEPHONE ENCOUNTER
Comments:     Last Office Visit (last PCP visit):   4/22/2024    Next Visit Date:  Future Appointments   Date Time Provider Department Center   7/22/2024  9:30 AM Horn, Mayda L, APRN - CNP [facility]       **If hasn't been seen in over a year OR hasn't followed up according to last diabetes/ADHD visit, make appointment for patient before sending refill to provider.    Rx requested:  Requested Prescriptions     Pending Prescriptions Disp Refills    albuterol sulfate HFA (PROVENTIL;VENTOLIN;PROAIR) 108 (90 Base) MCG/ACT inhaler 18 g 1     Sig: Inhale 2 puffs into the lungs every 4 hours as needed for Wheezing or Shortness of Breath    Tirzepatide (MOUNJARO) 2.5 MG/0.5ML SOPN SC injection 2 mL 0     Sig: Inject 0.5 mLs into the skin once a week    Tirzepatide (MOUNJARO) 5 MG/0.5ML SOPN SC injection 2 mL 2     Sig: Inject 0.5 mLs into the skin once a week    albuterol (PROVENTIL) (2.5 MG/3ML) 0.083% nebulizer solution 120 each      Sig: 3 mLs every 4 hours as needed

## 2024-06-28 ENCOUNTER — TELEPHONE (OUTPATIENT)
Dept: INTERNAL MEDICINE | Age: 34
End: 2024-06-28

## 2024-06-28 DIAGNOSIS — J45.20 MILD INTERMITTENT ASTHMA WITHOUT COMPLICATION: Primary | ICD-10-CM

## 2024-06-28 RX ORDER — ALBUTEROL SULFATE 2.5 MG/3ML
2.5 SOLUTION RESPIRATORY (INHALATION) EVERY 6 HOURS PRN
Qty: 120 EACH | Refills: 1 | Status: SHIPPED | OUTPATIENT
Start: 2024-06-28

## 2024-06-28 NOTE — TELEPHONE ENCOUNTER
Patient stopped by the office today. She stated the wrong prescription for MOUNJARO was filled. It should be 7.5, according to patient, NOT 2.5, please. She is requesting this be resent please.    Thank you

## 2024-06-28 NOTE — TELEPHONE ENCOUNTER
[facility] called, ins wont cover nebulizer as written, can you change it to 1 vile every 6 hours.  (12 ml every 6 hours)

## 2024-07-25 ENCOUNTER — OFFICE VISIT (OUTPATIENT)
Dept: INTERNAL MEDICINE | Age: 34
End: 2024-07-25
Payer: COMMERCIAL

## 2024-07-25 VITALS
OXYGEN SATURATION: 98 % | HEIGHT: 65 IN | WEIGHT: 250.4 LBS | DIASTOLIC BLOOD PRESSURE: 82 MMHG | HEART RATE: 75 BPM | BODY MASS INDEX: 41.72 KG/M2 | SYSTOLIC BLOOD PRESSURE: 126 MMHG

## 2024-07-25 DIAGNOSIS — E66.01 CLASS 3 SEVERE OBESITY DUE TO EXCESS CALORIES WITH SERIOUS COMORBIDITY AND BODY MASS INDEX (BMI) OF 40.0 TO 44.9 IN ADULT (HCC): ICD-10-CM

## 2024-07-25 DIAGNOSIS — R23.3 EASY BRUISING: ICD-10-CM

## 2024-07-25 DIAGNOSIS — E11.9 CONTROLLED TYPE 2 DIABETES MELLITUS WITHOUT COMPLICATION, WITHOUT LONG-TERM CURRENT USE OF INSULIN (HCC): Primary | ICD-10-CM

## 2024-07-25 PROBLEM — E66.813 CLASS 3 SEVERE OBESITY DUE TO EXCESS CALORIES WITH SERIOUS COMORBIDITY AND BODY MASS INDEX (BMI) OF 40.0 TO 44.9 IN ADULT: Status: ACTIVE | Noted: 2022-04-18

## 2024-07-25 LAB — HBA1C MFR BLD: 5.8 %

## 2024-07-25 PROCEDURE — 3044F HG A1C LEVEL LT 7.0%: CPT | Performed by: NURSE PRACTITIONER

## 2024-07-25 PROCEDURE — 99214 OFFICE O/P EST MOD 30 MIN: CPT | Performed by: NURSE PRACTITIONER

## 2024-07-25 PROCEDURE — 83036 HEMOGLOBIN GLYCOSYLATED A1C: CPT | Performed by: NURSE PRACTITIONER

## 2024-07-25 RX ORDER — BLOOD SUGAR DIAGNOSTIC
1 STRIP MISCELLANEOUS DAILY
COMMUNITY
Start: 2024-05-02

## 2024-07-25 RX ORDER — TIRZEPATIDE 7.5 MG/.5ML
7.5 INJECTION, SOLUTION SUBCUTANEOUS WEEKLY
Qty: 4 ADJUSTABLE DOSE PRE-FILLED PEN SYRINGE | Refills: 0 | Status: SHIPPED | OUTPATIENT
Start: 2024-07-25

## 2024-07-25 RX ORDER — TIRZEPATIDE 10 MG/.5ML
10 INJECTION, SOLUTION SUBCUTANEOUS WEEKLY
Qty: 4 ADJUSTABLE DOSE PRE-FILLED PEN SYRINGE | Refills: 5 | Status: SHIPPED | OUTPATIENT
Start: 2024-07-25

## 2024-07-25 RX ORDER — LANCETS
1 EACH MISCELLANEOUS ONCE
COMMUNITY
Start: 2024-05-02

## 2024-07-25 NOTE — PROGRESS NOTES
[facility]  [address] [id number]  Dept: [phone]  Dept Fax: [phone]  Loc: [phone]     MARY Lester (: 1990) is a 34 y.o. female, Established patient, here for evaluation of the following chief complaint(s):  Diabetes (Questions about bruising)      PCP:  Mayda Holt APRN - CNP      Patient is here for follow up on Diabetes.   How often are you checking your blood sugars?  occasionally  What are your average readings? 180 in morning, but has been eating later at night. If eats at normal time, 110-120 is her norm in morning  Do you have low blood sugar readings/symptoms? no  Do you have high blood sugar readings/symptoms? no  Are you compliant with your diet? No- hasn't been eating the best this last month of summer. Is eating less than was though since being put on mounjaro. Trying to get back on track  Do you exercise? no  Are you compliant with your medications? yes  Are you having difficulty affording your medications? No  Do you have any of the following symptoms?  Vision problems? no  GI-Nausea/committing/bloating? no  Lightheadedness? no  Paresthesias, Ulcerations or sores? No  Down 11 lbs since April with starting mounjaro. No unwanted side effects of medication, zero gi symptoms.     Diabetic Exams up to date?  Last a1c?  Lab Results       Component                Value               Date                       LABA1C                     5.8                   today                                          6.8                 2024              Diabetic foot exam:   Urine Microalbumin:   Last diabetic eye exam: due    Is bruising more easily.  Will wake up with bruises, even alight bump will trigger a bruise.           Review of Systems   Constitutional:         See HPI for ROS         Past Medical History:   Diagnosis Date    Asthma      Past

## 2024-07-26 DIAGNOSIS — R23.3 EASY BRUISING: ICD-10-CM

## 2024-07-26 DIAGNOSIS — E11.9 CONTROLLED TYPE 2 DIABETES MELLITUS WITHOUT COMPLICATION, WITHOUT LONG-TERM CURRENT USE OF INSULIN (HCC): ICD-10-CM

## 2024-07-26 LAB
ALBUMIN SERPL-MCNC: 4.1 G/DL (ref 3.5–4.6)
ALP SERPL-CCNC: 70 U/L (ref 40–130)
ALT SERPL-CCNC: 24 U/L (ref 0–33)
ANION GAP SERPL CALCULATED.3IONS-SCNC: 10 MEQ/L (ref 9–15)
AST SERPL-CCNC: 33 U/L (ref 0–35)
BASOPHILS # BLD: 0 K/UL (ref 0–0.2)
BASOPHILS NFR BLD: 0.5 %
BILIRUB SERPL-MCNC: 0.3 MG/DL (ref 0.2–0.7)
BUN SERPL-MCNC: 13 MG/DL (ref 6–20)
CALCIUM SERPL-MCNC: 8.8 MG/DL (ref 8.5–9.9)
CHLORIDE SERPL-SCNC: 103 MEQ/L (ref 95–107)
CHOLEST SERPL-MCNC: 212 MG/DL (ref 0–199)
CO2 SERPL-SCNC: 23 MEQ/L (ref 20–31)
CREAT SERPL-MCNC: 0.73 MG/DL (ref 0.5–0.9)
EOSINOPHIL # BLD: 0.2 K/UL (ref 0–0.7)
EOSINOPHIL NFR BLD: 3.3 %
ERYTHROCYTE [DISTWIDTH] IN BLOOD BY AUTOMATED COUNT: 12.7 % (ref 11.5–14.5)
GLOBULIN SER CALC-MCNC: 2.7 G/DL (ref 2.3–3.5)
GLUCOSE FASTING: 113 MG/DL (ref 70–99)
HCT VFR BLD AUTO: 42.5 % (ref 37–47)
HDLC SERPL-MCNC: 53 MG/DL (ref 40–59)
HGB BLD-MCNC: 14.6 G/DL (ref 12–16)
LDL CHOLESTEROL: 122 MG/DL (ref 0–129)
LYMPHOCYTES # BLD: 2.1 K/UL (ref 1–4.8)
LYMPHOCYTES NFR BLD: 32.8 %
MCH RBC QN AUTO: 32 PG (ref 27–31.3)
MCHC RBC AUTO-ENTMCNC: 34.4 % (ref 33–37)
MCV RBC AUTO: 93.2 FL (ref 79.4–94.8)
MONOCYTES # BLD: 0.4 K/UL (ref 0.2–0.8)
MONOCYTES NFR BLD: 6.9 %
NEUTROPHILS # BLD: 3.6 K/UL (ref 1.4–6.5)
NEUTS SEG NFR BLD: 56 %
PLATELET # BLD AUTO: 247 K/UL (ref 130–400)
POTASSIUM SERPL-SCNC: 4.6 MEQ/L (ref 3.4–4.9)
PROT SERPL-MCNC: 6.8 G/DL (ref 6.3–8)
RBC # BLD AUTO: 4.56 M/UL (ref 4.2–5.4)
SODIUM SERPL-SCNC: 136 MEQ/L (ref 135–144)
TRIGLYCERIDE, FASTING: 185 MG/DL (ref 0–150)
TSH REFLEX: 0.77 UIU/ML (ref 0.44–3.86)
WBC # BLD AUTO: 6.4 K/UL (ref 4.8–10.8)

## 2024-08-05 ENCOUNTER — TELEPHONE (OUTPATIENT)
Dept: INTERNAL MEDICINE | Age: 34
End: 2024-08-05

## 2024-08-05 NOTE — TELEPHONE ENCOUNTER
Pt did not review my chart message. Please inform:     Labs overall appear stable. Cholesterol remains elevated. Weight loss efforts to continue and will keep watching this for now. Darrick

## 2025-02-05 DIAGNOSIS — E11.9 CONTROLLED TYPE 2 DIABETES MELLITUS WITHOUT COMPLICATION, WITHOUT LONG-TERM CURRENT USE OF INSULIN (HCC): ICD-10-CM

## 2025-02-05 DIAGNOSIS — E66.813 CLASS 3 SEVERE OBESITY DUE TO EXCESS CALORIES WITH SERIOUS COMORBIDITY AND BODY MASS INDEX (BMI) OF 40.0 TO 44.9 IN ADULT: ICD-10-CM

## 2025-02-05 DIAGNOSIS — E66.01 CLASS 3 SEVERE OBESITY DUE TO EXCESS CALORIES WITH SERIOUS COMORBIDITY AND BODY MASS INDEX (BMI) OF 40.0 TO 44.9 IN ADULT: ICD-10-CM

## 2025-02-05 RX ORDER — TIRZEPATIDE 10 MG/.5ML
INJECTION, SOLUTION SUBCUTANEOUS
Qty: 4 ML | Refills: 2 | Status: SHIPPED | OUTPATIENT
Start: 2025-02-05

## 2025-02-05 NOTE — TELEPHONE ENCOUNTER
Comments:     Last Office Visit (last PCP visit):   7/25/2024    Next Visit Date:  No future appointments.    **If hasn't been seen in over a year OR hasn't followed up according to last diabetes/ADHD visit, make appointment for patient before sending refill to provider.    Rx requested:  Requested Prescriptions     Pending Prescriptions Disp Refills    MOUNJARO 10 MG/0.5ML SOAJ [Pharmacy Med Name: Mounjaro 10 MG/0.5ML Subcutaneous Solution Pen-injector] 4 mL 0     Sig: INJECT 1 SYRINGE SUBCUTANEOULY ONCE A WEEK

## 2025-03-12 ENCOUNTER — OFFICE VISIT (OUTPATIENT)
Dept: INTERNAL MEDICINE | Age: 35
End: 2025-03-12
Payer: COMMERCIAL

## 2025-03-12 ENCOUNTER — RESULTS FOLLOW-UP (OUTPATIENT)
Dept: INTERNAL MEDICINE | Age: 35
End: 2025-03-12

## 2025-03-12 VITALS
WEIGHT: 218 LBS | BODY MASS INDEX: 36.32 KG/M2 | SYSTOLIC BLOOD PRESSURE: 118 MMHG | HEIGHT: 65 IN | DIASTOLIC BLOOD PRESSURE: 68 MMHG | TEMPERATURE: 97.3 F

## 2025-03-12 DIAGNOSIS — E66.812 CLASS 2 SEVERE OBESITY DUE TO EXCESS CALORIES WITH SERIOUS COMORBIDITY AND BODY MASS INDEX (BMI) OF 36.0 TO 36.9 IN ADULT: ICD-10-CM

## 2025-03-12 DIAGNOSIS — E11.9 CONTROLLED TYPE 2 DIABETES MELLITUS WITHOUT COMPLICATION, WITHOUT LONG-TERM CURRENT USE OF INSULIN (HCC): Primary | ICD-10-CM

## 2025-03-12 DIAGNOSIS — E11.9 CONTROLLED TYPE 2 DIABETES MELLITUS WITHOUT COMPLICATION, WITHOUT LONG-TERM CURRENT USE OF INSULIN: ICD-10-CM

## 2025-03-12 DIAGNOSIS — N92.1 MENOMETRORRHAGIA: ICD-10-CM

## 2025-03-12 DIAGNOSIS — E66.01 CLASS 2 SEVERE OBESITY DUE TO EXCESS CALORIES WITH SERIOUS COMORBIDITY AND BODY MASS INDEX (BMI) OF 36.0 TO 36.9 IN ADULT: ICD-10-CM

## 2025-03-12 PROBLEM — F33.1 MODERATE EPISODE OF RECURRENT MAJOR DEPRESSIVE DISORDER (HCC): Status: RESOLVED | Noted: 2022-07-25 | Resolved: 2025-03-12

## 2025-03-12 LAB
CREAT UR-MCNC: 128 MG/DL
HBA1C MFR BLD: 5.3 %
MICROALBUMIN UR-MCNC: <1.2 MG/DL
MICROALBUMIN/CREAT UR-RTO: NORMAL MG/G (ref 0–30)

## 2025-03-12 PROCEDURE — 99214 OFFICE O/P EST MOD 30 MIN: CPT | Performed by: NURSE PRACTITIONER

## 2025-03-12 PROCEDURE — 3044F HG A1C LEVEL LT 7.0%: CPT | Performed by: NURSE PRACTITIONER

## 2025-03-12 PROCEDURE — 83036 HEMOGLOBIN GLYCOSYLATED A1C: CPT | Performed by: NURSE PRACTITIONER

## 2025-03-12 RX ORDER — LEVONORGESTREL AND ETHINYL ESTRADIOL 0.15-0.03
1 KIT ORAL DAILY
Qty: 3 PACKET | Refills: 3 | Status: SHIPPED | OUTPATIENT
Start: 2025-03-12

## 2025-03-12 SDOH — ECONOMIC STABILITY: FOOD INSECURITY: WITHIN THE PAST 12 MONTHS, YOU WORRIED THAT YOUR FOOD WOULD RUN OUT BEFORE YOU GOT MONEY TO BUY MORE.: NEVER TRUE

## 2025-03-12 SDOH — ECONOMIC STABILITY: FOOD INSECURITY: WITHIN THE PAST 12 MONTHS, THE FOOD YOU BOUGHT JUST DIDN'T LAST AND YOU DIDN'T HAVE MONEY TO GET MORE.: NEVER TRUE

## 2025-03-12 ASSESSMENT — PATIENT HEALTH QUESTIONNAIRE - PHQ9
2. FEELING DOWN, DEPRESSED OR HOPELESS: NOT AT ALL
SUM OF ALL RESPONSES TO PHQ QUESTIONS 1-9: 0
7. TROUBLE CONCENTRATING ON THINGS, SUCH AS READING THE NEWSPAPER OR WATCHING TELEVISION: NOT AT ALL
1. LITTLE INTEREST OR PLEASURE IN DOING THINGS: NOT AT ALL
4. FEELING TIRED OR HAVING LITTLE ENERGY: NOT AT ALL
3. TROUBLE FALLING OR STAYING ASLEEP: NOT AT ALL
SUM OF ALL RESPONSES TO PHQ QUESTIONS 1-9: 0
8. MOVING OR SPEAKING SO SLOWLY THAT OTHER PEOPLE COULD HAVE NOTICED. OR THE OPPOSITE, BEING SO FIGETY OR RESTLESS THAT YOU HAVE BEEN MOVING AROUND A LOT MORE THAN USUAL: NOT AT ALL
5. POOR APPETITE OR OVEREATING: NOT AT ALL
SUM OF ALL RESPONSES TO PHQ QUESTIONS 1-9: 0
SUM OF ALL RESPONSES TO PHQ QUESTIONS 1-9: 0
6. FEELING BAD ABOUT YOURSELF - OR THAT YOU ARE A FAILURE OR HAVE LET YOURSELF OR YOUR FAMILY DOWN: NOT AT ALL
10. IF YOU CHECKED OFF ANY PROBLEMS, HOW DIFFICULT HAVE THESE PROBLEMS MADE IT FOR YOU TO DO YOUR WORK, TAKE CARE OF THINGS AT HOME, OR GET ALONG WITH OTHER PEOPLE: NOT DIFFICULT AT ALL
9. THOUGHTS THAT YOU WOULD BE BETTER OFF DEAD, OR OF HURTING YOURSELF: NOT AT ALL

## 2025-03-12 NOTE — PROGRESS NOTES
Subjective  Nafisa Trevon, 35 y.o. female Established patient presents today with:  Chief Complaint   Patient presents with    Diabetes     Big Toenails look strange and have not been growing.  Started this past month.  They are turning in.  Was unsure if this is something do do with diabetes.   said she can't get a pedicure until it's checked out.    Didn't take monjuaro this week she dropped her pen and all the medicine came out.      Other     Had 2 periods in 1 month (since the beginning of the year)       History of Present Illness  The patient is a 35-year-old female who presents for evaluation of diabetes, weight management, irregular menstrual cycles, and broken toenail.    She reports a general sense of well-being with no specific complaints. She has been tolerating Mounjaro 10 mg well, with no adverse effects such as gastrointestinal symptoms. She has experienced significant weight loss, totaling 32 pounds since her last visit, and perceives that her weight loss has plateaued. She maintains a regular exercise regimen at the gym.    She has been experiencing irregular menstrual cycles, with only a 2-week interval between periods since January 2025. Prior to this, she had a history of menstrual irregularities, which were evaluated with ultrasound and attributed to stress. She has not sought obstetric care for these issues. She expresses interest in birth control pills to regulate her menstrual cycle but is concerned about potential side effects, as she had previously discontinued birth control due to exacerbation of her menstrual symptoms when was a teen. She reports no current menstrual cramps. She was on birth control when she was young due to heavy periods and cramps.She has had her tubes tied.     She reports a broken toenail that has not regrown for approximately 1.5 months, resulting in an unusual angle. She does not experience any pain associated with this condition. Additionally, she

## 2025-04-22 ENCOUNTER — TELEPHONE (OUTPATIENT)
Dept: INTERNAL MEDICINE | Age: 35
End: 2025-04-22